# Patient Record
Sex: FEMALE | Race: WHITE | NOT HISPANIC OR LATINO | Employment: UNEMPLOYED | URBAN - METROPOLITAN AREA
[De-identification: names, ages, dates, MRNs, and addresses within clinical notes are randomized per-mention and may not be internally consistent; named-entity substitution may affect disease eponyms.]

---

## 2017-01-11 ENCOUNTER — OFFICE VISIT (OUTPATIENT)
Dept: ENDOCRINOLOGY | Facility: CLINIC | Age: 57
End: 2017-01-11

## 2017-01-11 VITALS
DIASTOLIC BLOOD PRESSURE: 74 MMHG | BODY MASS INDEX: 24.5 KG/M2 | WEIGHT: 175 LBS | HEIGHT: 71 IN | SYSTOLIC BLOOD PRESSURE: 120 MMHG | HEART RATE: 72 BPM | OXYGEN SATURATION: 96 %

## 2017-01-11 DIAGNOSIS — E03.9 ACQUIRED HYPOTHYROIDISM: Primary | ICD-10-CM

## 2017-01-11 DIAGNOSIS — E55.9 VITAMIN D DEFICIENCY: ICD-10-CM

## 2017-01-11 DIAGNOSIS — E03.9 HYPOTHYROIDISM (ACQUIRED): ICD-10-CM

## 2017-01-11 LAB
T4 FREE SERPL-MCNC: 1.16 NG/DL (ref 0.89–1.76)
TSH SERPL DL<=0.005 MIU/L-ACNC: 1.36 MIU/ML (ref 0.35–5.35)

## 2017-01-11 PROCEDURE — 99213 OFFICE O/P EST LOW 20 MIN: CPT | Performed by: INTERNAL MEDICINE

## 2017-01-11 RX ORDER — CHOLECALCIFEROL (VITAMIN D3) 50 MCG
2000 TABLET ORAL DAILY
Qty: 30 TABLET | Refills: 11 | Status: SHIPPED | OUTPATIENT
Start: 2017-01-11 | End: 2017-08-28

## 2017-01-11 RX ORDER — LEVOTHYROXINE SODIUM 0.05 MG/1
50 TABLET ORAL DAILY
Qty: 30 TABLET | Refills: 11 | Status: SHIPPED | OUTPATIENT
Start: 2017-01-11 | End: 2017-07-28 | Stop reason: SDUPTHER

## 2017-01-11 RX ORDER — OMEPRAZOLE 20 MG/1
20 CAPSULE, DELAYED RELEASE ORAL DAILY
COMMUNITY
End: 2020-09-01

## 2017-01-11 NOTE — MR AVS SNAPSHOT
China Gomes   1/11/2017 10:45 AM   Office Visit    Dept Phone:  [phone]   Encounter #:  [id number]    Provider:  Billie DARNELL MD   Department:  [facility] INTERNAL MEDICINE AND ENDOCRINOLOGY                Your Full Care Plan              Today's Medication Changes          These changes are accurate as of: 1/11/17 11:56 AM.  If you have any questions, ask your nurse or doctor.               New Medication(s)Ordered:     Vitamin D 2000 UNITS tablet   Take 2,000 Units by mouth Daily.   Started by:  Billie DARNELL MD            Where to Get Your Medications      You can get these medications from any pharmacy     Bring a paper prescription for each of these medications     levothyroxine 50 MCG tablet    Vitamin D 2000 UNITS tablet                  Your Updated Medication List          This list is accurate as of: 1/11/17 11:56 AM.  Always use your most recent med list.                estrogen (conjugated)-medroxyprogesterone 0.3-1.5 MG per tablet   Commonly known as:  PREMPRO       levothyroxine 50 MCG tablet   Commonly known as:  SYNTHROID, LEVOTHROID   Take 1 tablet by mouth Daily.       omeprazole 20 MG capsule   Commonly known as:  priLOSEC       Vitamin D 2000 UNITS tablet   Take 2,000 Units by mouth Daily.               We Performed the Following     T4, Free     TSH       You Were Diagnosed With        Codes Comments    Acquired hypothyroidism    -  Primary ICD-10-CM: E03.9  ICD-9-CM: 244.9     Hypothyroidism (acquired)     ICD-10-CM: E03.9  ICD-9-CM: 244.9     Vitamin D deficiency     ICD-10-CM: E55.9  ICD-9-CM: 268.9       Instructions     None    Patient Instructions History      Upcoming Appointments     Visit Type Date Time Department    FO[facility]11[facility]2017 10:[facility]    FOLLOW UP 7/19/2017  1:45 PM [facility] END [facility]      MyChart Signup     [facility] [employer] allows you to send messages to your doctor, view your test results, renew your  "prescriptions, schedule appointments, and more. To sign up, go to [email or web address] and click on the Sign Up Now link in the New User? box. Enter your [employer] Activation Code exactly as it appears below along with the last four digits of your Social Security Number and your Date of Birth () to complete the sign-up process. If you do not sign up before the expiration date, you must request a new code.    [employer] Activation Code: [email or web address]  Expires: 2017 11:56 AM    If you have questions, you can email [email or web address] or call [phone] to talk to our [employer] staff. Remember, [employer] is NOT to be used for urgent needs. For medical emergencies, dial 911.               Other Info from Your Visit           Your Appointments     2017  1:45 PM EDT   Follow Up with Billie DARNELL MD   [facility] INTERNAL MEDICINE AND ENDOCRINOLOGY (--)    [address] [place] [id number] [phone]           Arrive 15 minutes prior to appointment.              Allergies     Demerol [Meperidine]  Itching      Reason for Visit     Hypothyroidism F/u visit for hypothyroidism, no recent labs. Pt stated no new sx or concerns and feels great overall.       Vital Signs     Blood Pressure Pulse Height Weight Oxygen Saturation Body Mass Index    120/74 72 71\" (180.3 cm) 175 lb (79.4 kg) 96% 24.41 kg/m2    Smoking Status                   Never Smoker           Problems and Diagnoses Noted     Underactive thyroid    Vitamin D deficiency    Acquired underactive thyroid            "

## 2017-01-11 NOTE — PROGRESS NOTES
"Chief complaint  Hypothyroidism (F/u visit for hypothyroidism, no recent labs. Pt stated no new sx or concerns and feels great overall. )    Subjective   China Gomes is a 56 y.o. female is here today for follow-up.  Hypothyroidism, diagnosed in 2012.   Restarted Levothyroxine 25 mcg in March 2016 and increased to 50 mcg 6/2016. says feeling better overall     Vitamin D deficiency with level of 12.2 in June 2016. She completed 2 months of high dose Vitamin D       HRT is given by GYN. It controls the symptoms well.     She says that her energy level is well on current dose, no new complaints  Medications    Current Outpatient Prescriptions:   •  omeprazole (priLOSEC) 20 MG capsule, Take 20 mg by mouth Daily., Disp: , Rfl:   •  Cholecalciferol (VITAMIN D) 2000 UNITS tablet, Take 2,000 Units by mouth Daily., Disp: 30 tablet, Rfl: 11  •  estrogen, conjugated,-medroxyprogesterone (PREMPRO) 0.3-1.5 MG per tablet, Take 1 tablet by mouth daily., Disp: , Rfl:   •  levothyroxine (SYNTHROID, LEVOTHROID) 50 MCG tablet, Take 1 tablet by mouth Daily., Disp: 30 tablet, Rfl: 11    PMH  The following portions of the patient's history were reviewed and updated as appropriate: allergies, current medications, past family history, past medical history, past social history, past surgical history and problem list.    Review of systems  Review of Systems   All other systems reviewed and are negative.      Physical exam  Objective   Blood pressure 120/74, pulse 72, height 71\" (180.3 cm), weight 175 lb (79.4 kg), SpO2 96 %. Body mass index is 24.41 kg/(m^2).  Physical Exam   Constitutional: She is oriented to person, place, and time. She appears well-developed and well-nourished.   HENT:   Head: Normocephalic and atraumatic.   Mouth/Throat: Oropharynx is clear and moist.   Eyes: Conjunctivae are normal.   Neck: Normal range of motion. No muscular tenderness present. Carotid bruit is not present. No thyroid mass and no thyromegaly " present.   The neck is supple and no assimetry visualized   Cardiovascular: Normal rate, regular rhythm and normal heart sounds.    Pulmonary/Chest: Effort normal and breath sounds normal.   Musculoskeletal: She exhibits no edema.   Lymphadenopathy:     She has no cervical adenopathy.   Neurological: She is alert and oriented to person, place, and time.   Skin: Skin is warm. No rash noted.   Psychiatric: She has a normal mood and affect. Thought content normal.   Vitals reviewed.        LABS AND IMAGING  No visits with results within 1 Month(s) from this visit.  Latest known visit with results is:    Office Visit on 05/25/2016   Component Date Value Ref Range Status   • TSH 05/25/2016 2.960  0.450 - 4.500 uIU/mL Final           Assessment  Assessment/Plan   Problem List Items Addressed This Visit        Digestive    Vitamin D deficiency       Endocrine    Hypothyroidism - Primary    Relevant Medications    levothyroxine (SYNTHROID, LEVOTHROID) 50 MCG tablet    Other Relevant Orders    TSH    T4, Free      Other Visit Diagnoses     Hypothyroidism (acquired)        Relevant Medications    levothyroxine (SYNTHROID, LEVOTHROID) 50 MCG tablet          Plan  Continue same dose levothyroxine 50 µg. Repeat labs today    I have advised patient to start maintenance dose vitamin D 2000 units daily    Follow-up in 6 months

## 2017-01-13 ENCOUNTER — OFFICE VISIT (OUTPATIENT)
Dept: FAMILY MEDICINE CLINIC | Facility: CLINIC | Age: 57
End: 2017-01-13

## 2017-01-13 VITALS
BODY MASS INDEX: 24.64 KG/M2 | DIASTOLIC BLOOD PRESSURE: 72 MMHG | HEIGHT: 71 IN | TEMPERATURE: 97.3 F | HEART RATE: 77 BPM | SYSTOLIC BLOOD PRESSURE: 122 MMHG | OXYGEN SATURATION: 99 % | WEIGHT: 176 LBS

## 2017-01-13 DIAGNOSIS — H69.91 EUSTACHIAN TUBE DISORDER, RIGHT: ICD-10-CM

## 2017-01-13 DIAGNOSIS — H65.01 RIGHT ACUTE SEROUS OTITIS MEDIA, RECURRENCE NOT SPECIFIED: Primary | ICD-10-CM

## 2017-01-13 PROCEDURE — 99213 OFFICE O/P EST LOW 20 MIN: CPT | Performed by: NURSE PRACTITIONER

## 2017-01-13 RX ORDER — AMOXICILLIN 500 MG/1
500 TABLET, FILM COATED ORAL 3 TIMES DAILY
Qty: 30 TABLET | Refills: 0 | Status: SHIPPED | OUTPATIENT
Start: 2017-01-13 | End: 2017-01-23

## 2017-01-13 NOTE — MR AVS SNAPSHOT
China Gomes   2017 1:00 PM   Office Visit    Dept Phone:  [phone]   Encounter #:  [id number]    Provider:  JOSE LUIS Ordonez   Department:  [facility] FAMILY MEDICINE                Your Full Care Plan              Your Updated Medication List          This list is accurate as of: 17  1:45 PM.  Always use your most recent med list.                estrogen (conjugated)-medroxyprogesterone 0.3-1.5 MG per tablet   Commonly known as:  PREMPRO       levothyroxine 50 MCG tablet   Commonly known as:  SYNTHROID, LEVOTHROID   Take 1 tablet by mouth Daily.       omeprazole 20 MG capsule   Commonly known as:  priLOSEC       Vitamin D 2000 UNITS tablet   Take 2,000 Units by mouth Daily.               You Were Diagnosed With        Codes Comments    Right acute serous otitis media, recurrence not specified    -  Primary ICD-10-CM: H65.01  ICD-9-CM: 381.01       Instructions     None    Patient Instructions History      Upcoming Appointments     Visit Type Date Time Department    SAME DAY 2017  1:00 PM [facility]    FOLLOW UP 2017  1:45 PM [facility]      [facility] Sign[facility]     [facility] [employer] allows you to send messages to your doctor, view your test results, renew your prescriptions, schedule appointments, and more. To sign up, go to [address] and click on the Sign Up Now link in the New User? box. Enter your [employer] Activation Code exactly as it appears below along with the last four digits of your Social Security Number and your Date of Birth () to complete the sign-up process. If you do not sign up before the expiration date, you must request a new code.    [employer] Activation Code: [email or web address]  Expires: 2017 11:56 AM    If you have questions, you can email [email or web address] or call [phone] to talk to our [employer] staff. Remember, [employer] is NOT to be used for urgent needs. For medical  "emergencies, dial 911.               Other Info from Your Visit           Your Appointments     Jul 19, 2017  1:45 PM EDT   Follow Up with Billie DARNELL MD   [facility] INTERNAL MEDICINE AND ENDOCRINOLOGY (--)    [address] [place] [id number] [phone]           Arrive 15 minutes prior to appointment.              Allergies     Demerol [Meperidine]  Itching      Reason for Visit     Earache r ear      Vital Signs     Blood Pressure Pulse Temperature Height Weight Oxygen Saturation    122/72 77 97.3 °F (36.3 °C) 71\" (180.3 cm) 176 lb (79.8 kg) 99%    Body Mass Index Smoking Status                24.55 kg/m2 Never Smoker          Problems and Diagnoses Noted     Right middle ear infection    -  Primary        "

## 2017-01-13 NOTE — PROGRESS NOTES
Subjective   China Gomes is a 56 y.o. female.     History of Present Illness     Right ear pain  Ms Gomes comes in today with c/o right ear pain for 2 days.  She had a URI 2 weeks prior, that has resolved.  She denies fever, vertigo or headaches.      The following portions of the patient's history were reviewed and updated as appropriate: allergies, current medications, past family history, past medical history, past social history, past surgical history and problem list.    Review of Systems   Constitutional: Negative.    HENT: Positive for ear pain. Negative for congestion, ear discharge and postnasal drip.    Respiratory: Negative.    Cardiovascular: Negative.    Neurological: Negative.        Objective   Physical Exam   Constitutional: She is oriented to person, place, and time. She appears well-developed and well-nourished. No distress.   HENT:   Head: Normocephalic and atraumatic.   Left Ear: External ear normal.   Nose: Nose normal.   Mouth/Throat: Oropharynx is clear and moist.   Right TM is dull with slight erythema   Eyes: Conjunctivae are normal. Pupils are equal, round, and reactive to light.   Neck: Normal range of motion. Neck supple.   Cardiovascular: Normal rate, regular rhythm, normal heart sounds and intact distal pulses.  Exam reveals no gallop and no friction rub.    No murmur heard.  Pulmonary/Chest: Effort normal and breath sounds normal. No respiratory distress. She has no wheezes. She has no rales.   Lymphadenopathy:     She has no cervical adenopathy.   Neurological: She is alert and oriented to person, place, and time.       Assessment/Plan   China was seen today for earache.    Diagnoses and all orders for this visit:    Right acute serous otitis media, recurrence not specified  -     amoxicillin (AMOXIL) 500 MG tablet; Take 1 tablet by mouth 3 (Three) Times a Day for 10 days.    Eustachian tube disorder, right      She is flying Monday, I also hand wrote her one 4m dexathasone  tablet to take before flying for there ETD.

## 2017-01-23 DIAGNOSIS — E03.9 HYPOTHYROIDISM (ACQUIRED): ICD-10-CM

## 2017-01-23 RX ORDER — CHOLECALCIFEROL (VITAMIN D3) 50 MCG
2000 TABLET ORAL DAILY
Qty: 30 TABLET | Refills: 11 | OUTPATIENT
Start: 2017-01-23 | End: 2018-01-23

## 2017-01-23 RX ORDER — LEVOTHYROXINE SODIUM 0.05 MG/1
50 TABLET ORAL DAILY
Qty: 30 TABLET | Refills: 11 | OUTPATIENT
Start: 2017-01-23

## 2017-03-13 ENCOUNTER — OFFICE VISIT (OUTPATIENT)
Dept: FAMILY MEDICINE CLINIC | Facility: CLINIC | Age: 57
End: 2017-03-13

## 2017-03-13 VITALS
DIASTOLIC BLOOD PRESSURE: 80 MMHG | SYSTOLIC BLOOD PRESSURE: 118 MMHG | TEMPERATURE: 98.8 F | WEIGHT: 174 LBS | OXYGEN SATURATION: 98 % | BODY MASS INDEX: 24.36 KG/M2 | HEART RATE: 84 BPM | HEIGHT: 71 IN

## 2017-03-13 DIAGNOSIS — H65.05 RECURRENT ACUTE SEROUS OTITIS MEDIA OF LEFT EAR: Primary | ICD-10-CM

## 2017-03-13 PROCEDURE — 99213 OFFICE O/P EST LOW 20 MIN: CPT | Performed by: NURSE PRACTITIONER

## 2017-03-13 PROCEDURE — 96372 THER/PROPH/DIAG INJ SC/IM: CPT | Performed by: NURSE PRACTITIONER

## 2017-03-13 RX ORDER — AMOXICILLIN 500 MG/1
500 TABLET, FILM COATED ORAL 3 TIMES DAILY
Qty: 30 TABLET | Refills: 0 | Status: SHIPPED | OUTPATIENT
Start: 2017-03-13 | End: 2017-03-23

## 2017-03-13 RX ORDER — METHYLPREDNISOLONE ACETATE 80 MG/ML
80 INJECTION, SUSPENSION INTRA-ARTICULAR; INTRALESIONAL; INTRAMUSCULAR; SOFT TISSUE ONCE
Status: COMPLETED | OUTPATIENT
Start: 2017-03-13 | End: 2017-03-13

## 2017-03-13 RX ADMIN — METHYLPREDNISOLONE ACETATE 80 MG: 80 INJECTION, SUSPENSION INTRA-ARTICULAR; INTRALESIONAL; INTRAMUSCULAR; SOFT TISSUE at 09:50

## 2017-03-13 NOTE — PROGRESS NOTES
"Subjective   China Gomes is a 57 y.o. female.     History of Present Illness   China is here today with complaint of left ear pain and \"ringing\" after an airplane flight 2 weeks ago.States it feels like she is \"in a hole\" and has hearing loss. She was treated for right otitis media prior to the trip and has history of recurrent otitis.  The following portions of the patient's history were reviewed and updated as appropriate: allergies, current medications, past family history, past medical history, past social history, past surgical history and problem list.    Review of Systems   HENT: Positive for ear pain and hearing loss. Negative for congestion, drooling, ear discharge, facial swelling, postnasal drip, rhinorrhea and sinus pressure.    Eyes: Negative.    Respiratory: Negative.    Cardiovascular: Negative.    Skin: Negative.    Neurological: Positive for dizziness. Negative for seizures, facial asymmetry, light-headedness, numbness and headaches.   Psychiatric/Behavioral: Negative.        Objective   Physical Exam   Constitutional: She is oriented to person, place, and time. She appears well-developed and well-nourished.   HENT:   Head: Normocephalic and atraumatic.   Right Ear: Hearing, external ear and ear canal normal. No middle ear effusion.   Left Ear: External ear and ear canal normal. Tympanic membrane is retracted. A middle ear effusion is present. Decreased hearing is noted.   Mouth/Throat: Oropharynx is clear and moist.   Eyes: Conjunctivae and EOM are normal. No scleral icterus.   Neck: Normal range of motion. Neck supple.   Cardiovascular: Normal rate, regular rhythm and normal heart sounds.    No murmur heard.  No bruit   Pulmonary/Chest: Effort normal and breath sounds normal.   Neurological: She is alert and oriented to person, place, and time.   Skin: Skin is warm and dry.   Psychiatric: She has a normal mood and affect. Her behavior is normal. Judgment and thought content normal. "   Nursing note and vitals reviewed.      Assessment/Plan   China was seen today for earache.    Diagnoses and all orders for this visit:    Recurrent acute serous otitis media of left ear  -     methylPREDNISolone acetate (DEPO-medrol) injection 80 mg; Inject 1 mL into the shoulder, thigh, or buttocks 1 (One) Time.  -     amoxicillin (AMOXIL) 500 MG tablet; Take 1 tablet by mouth 3 (Three) Times a Day for 10 days.        I think she has some ETD which is contributing to her chronic fluid behind TM.  I am going to give her 40 mg Depo-medrol injection  to decrease inflammation in ear; antibiotic prescribed to treat infection.  Patient was encouraged to keep me informed of any acute changes, lack of improvement, or any new concerning symptoms.    Written by Belen ALDRIDGE, APRN student, acting as a scribe for JOSE LUIS Garcia  This note accurately reflects work and decisions made by myself, Mojgan ASCENCIO

## 2017-06-08 ENCOUNTER — TELEPHONE (OUTPATIENT)
Dept: INTERNAL MEDICINE | Facility: CLINIC | Age: 57
End: 2017-06-08

## 2017-06-08 NOTE — TELEPHONE ENCOUNTER
Since you will not be in that following week do you want to s/d her for next available or work in sometime in Aug?

## 2017-06-08 NOTE — TELEPHONE ENCOUNTER
PATIENT HAD TO CANCEL HER APPOINTMENT WITH DR. BUENO ON 07/19/2017. SHE NEEDS TO RESCHEDULE THIS APPOINTMENT. SHE WOULD LIKE TO GET AN APPOINTMENT CLOSE TO THIS APPOINTMENT. THE NEXT WEEK OR SO AFTER CANCELED APPOINTMENT. YOU CAN REACH HER BACK [phone]

## 2017-07-10 ENCOUNTER — RESULTS ENCOUNTER (OUTPATIENT)
Dept: ENDOCRINOLOGY | Facility: CLINIC | Age: 57
End: 2017-07-10

## 2017-07-10 DIAGNOSIS — E03.9 ACQUIRED HYPOTHYROIDISM: ICD-10-CM

## 2017-07-28 DIAGNOSIS — E03.9 HYPOTHYROIDISM (ACQUIRED): ICD-10-CM

## 2017-07-28 RX ORDER — LEVOTHYROXINE SODIUM 0.05 MG/1
TABLET ORAL
Qty: 30 TABLET | Refills: 0 | Status: SHIPPED | OUTPATIENT
Start: 2017-07-28 | End: 2017-08-28 | Stop reason: SDUPTHER

## 2017-08-28 ENCOUNTER — OFFICE VISIT (OUTPATIENT)
Dept: ENDOCRINOLOGY | Facility: CLINIC | Age: 57
End: 2017-08-28

## 2017-08-28 VITALS
HEIGHT: 71 IN | SYSTOLIC BLOOD PRESSURE: 134 MMHG | OXYGEN SATURATION: 98 % | DIASTOLIC BLOOD PRESSURE: 74 MMHG | WEIGHT: 183.2 LBS | HEART RATE: 74 BPM | BODY MASS INDEX: 25.65 KG/M2

## 2017-08-28 DIAGNOSIS — E03.9 HYPOTHYROIDISM (ACQUIRED): ICD-10-CM

## 2017-08-28 DIAGNOSIS — E03.9 ACQUIRED HYPOTHYROIDISM: Primary | ICD-10-CM

## 2017-08-28 LAB
ALBUMIN SERPL-MCNC: 4.1 G/DL (ref 3.2–4.8)
ALBUMIN/GLOB SERPL: 1.6 G/DL (ref 1.5–2.5)
ALP SERPL-CCNC: 106 U/L (ref 25–100)
ALT SERPL-CCNC: 17 U/L (ref 7–40)
AST SERPL-CCNC: 20 U/L (ref 0–33)
BILIRUB SERPL-MCNC: 0.4 MG/DL (ref 0.3–1.2)
BUN SERPL-MCNC: 10 MG/DL (ref 9–23)
BUN/CREAT SERPL: 16.7 (ref 7–25)
CALCIUM SERPL-MCNC: 8.8 MG/DL (ref 8.7–10.4)
CHLORIDE SERPL-SCNC: 101 MMOL/L (ref 99–109)
CO2 SERPL-SCNC: 26 MMOL/L (ref 20–31)
CREAT SERPL-MCNC: 0.6 MG/DL (ref 0.6–1.3)
GLOBULIN SER CALC-MCNC: 2.5 GM/DL
GLUCOSE SERPL-MCNC: 85 MG/DL (ref 70–100)
POTASSIUM SERPL-SCNC: 4 MMOL/L (ref 3.5–5.5)
PROT SERPL-MCNC: 6.6 G/DL (ref 5.7–8.2)
SODIUM SERPL-SCNC: 142 MMOL/L (ref 132–146)
T4 FREE SERPL-MCNC: 1.19 NG/DL (ref 0.89–1.76)
TSH SERPL DL<=0.005 MIU/L-ACNC: 1.63 MIU/ML (ref 0.35–5.35)

## 2017-08-28 PROCEDURE — 99213 OFFICE O/P EST LOW 20 MIN: CPT | Performed by: INTERNAL MEDICINE

## 2017-08-28 RX ORDER — LEVOTHYROXINE SODIUM 0.05 MG/1
50 TABLET ORAL DAILY
Qty: 30 TABLET | Refills: 11 | Status: SHIPPED | OUTPATIENT
Start: 2017-08-28 | End: 2018-02-28 | Stop reason: SDUPTHER

## 2017-08-28 NOTE — PROGRESS NOTES
"Chief complaint  Hypothyroidism (F/u visit for hypothyroidism, Pt stated that she has been feeling great on new levothyroxine dosage. Has been having edema in bilateral legs in the evenings. )    Subjective   China Gomes is a 57 y.o. female is here today for follow-up.  Hypothyroidism, diagnosed in 2012.   Restarted Levothyroxine 25 mcg in March 2016 and increased to 50 mcg 6/2016. says feeling better overall     Vitamin D deficiency with level of 12.2 in June 2016. She completed 2 months of high dose Vitamin D       HRT is given by GYN. It controls the symptoms well.     She says that her energy level is well on current dose. C/o leg swelling, worse in the evening. Eats out more often or after a trip.     Medications    Current Outpatient Prescriptions:   •  estrogen, conjugated,-medroxyprogesterone (PREMPRO) 0.3-1.5 MG per tablet, Take 1 tablet by mouth daily., Disp: , Rfl:   •  levothyroxine (SYNTHROID, LEVOTHROID) 50 MCG tablet, take 1 tablet by mouth once daily, Disp: 30 tablet, Rfl: 0  •  omeprazole (priLOSEC) 20 MG capsule, Take 20 mg by mouth Daily., Disp: , Rfl:     PMH  The following portions of the patient's history were reviewed and updated as appropriate: allergies, current medications, past family history, past medical history, past social history, past surgical history and problem list.    Review of systems  Review of Systems   Constitutional: Positive for unexpected weight change (weight gain ).   Cardiovascular: Positive for leg swelling.   All other systems reviewed and are negative.      Physical exam  Objective   Blood pressure 134/74, pulse 74, height 71\" (180.3 cm), weight 183 lb 3.2 oz (83.1 kg), SpO2 98 %. Body mass index is 25.55 kg/(m^2).  Physical Exam   Constitutional: She is oriented to person, place, and time. She appears well-developed and well-nourished.   HENT:   Head: Normocephalic and atraumatic.   Mouth/Throat: Oropharynx is clear and moist.   Eyes: Conjunctivae are normal. "   Neck: Normal range of motion. No muscular tenderness present. Carotid bruit is not present. No thyroid mass and no thyromegaly present.   The neck is supple and no assimetry visualized   Cardiovascular: Normal rate, regular rhythm and normal heart sounds.    Pulmonary/Chest: Effort normal and breath sounds normal.   Musculoskeletal: She exhibits no edema.   Lymphadenopathy:     She has no cervical adenopathy.   Neurological: She is alert and oriented to person, place, and time.   Skin: Skin is warm. No rash noted.   Psychiatric: She has a normal mood and affect. Thought content normal.   Vitals reviewed.        LABS AND IMAGING  No visits with results within 1 Month(s) from this visit.  Latest known visit with results is:    Office Visit on 01/11/2017   Component Date Value Ref Range Status   • TSH 01/11/2017 1.365  0.350 - 5.350 mIU/mL Final   • Free T4 01/11/2017 1.16  0.89 - 1.76 ng/dL Final           Assessment  Assessment/Plan   Problem List Items Addressed This Visit        Digestive    Vitamin D deficiency       Endocrine    Hypothyroidism - Primary          Plan  Continue same dose levothyroxine 50 µg. Repeat labs today  Monitor salt intake. Consider low dose diuretic if persists.     Cont maintenance dose vitamin D 2000 units daily.     Follow-up in 6 months

## 2018-02-28 ENCOUNTER — OFFICE VISIT (OUTPATIENT)
Dept: ENDOCRINOLOGY | Facility: CLINIC | Age: 58
End: 2018-02-28

## 2018-02-28 VITALS
BODY MASS INDEX: 25.9 KG/M2 | HEART RATE: 80 BPM | WEIGHT: 185 LBS | SYSTOLIC BLOOD PRESSURE: 122 MMHG | DIASTOLIC BLOOD PRESSURE: 76 MMHG | HEIGHT: 71 IN | OXYGEN SATURATION: 98 %

## 2018-02-28 DIAGNOSIS — R53.83 OTHER FATIGUE: ICD-10-CM

## 2018-02-28 DIAGNOSIS — E03.9 HYPOTHYROIDISM (ACQUIRED): ICD-10-CM

## 2018-02-28 DIAGNOSIS — E55.9 VITAMIN D DEFICIENCY: ICD-10-CM

## 2018-02-28 DIAGNOSIS — E03.9 ACQUIRED HYPOTHYROIDISM: Primary | ICD-10-CM

## 2018-02-28 LAB
25(OH)D3+25(OH)D2 SERPL-MCNC: 5.8 NG/ML
T4 FREE SERPL-MCNC: 1.19 NG/DL (ref 0.89–1.76)
TSH SERPL DL<=0.005 MIU/L-ACNC: 4.62 MIU/ML (ref 0.35–5.35)

## 2018-02-28 PROCEDURE — 99213 OFFICE O/P EST LOW 20 MIN: CPT | Performed by: INTERNAL MEDICINE

## 2018-02-28 RX ORDER — LEVOTHYROXINE SODIUM 0.05 MG/1
50 TABLET ORAL DAILY
Qty: 30 TABLET | Refills: 11 | Status: SHIPPED | OUTPATIENT
Start: 2018-02-28 | End: 2018-03-08 | Stop reason: ALTCHOICE

## 2018-02-28 NOTE — PROGRESS NOTES
"Chief complaint  Hypothyroidism (F/u for hypothyroidism, C/o weight gain. )    Subjective   China Gomes is a 58 y.o. female is here today for follow-up.  Hypothyroidism, diagnosed in 2012.   Restarted Levothyroxine 25 mcg in March 2016 and increased to 50 mcg 6/2016.     Vitamin D deficiency with level of 12.2 in June 2016. She completed 2 months of high dose Vitamin D. She I snot taking Vit D now.       HRT is given by GYN. It controls the symptoms well.     C/o weight gain and fatigue.      Medications    Current Outpatient Prescriptions:   •  estrogen, conjugated,-medroxyprogesterone (PREMPRO) 0.3-1.5 MG per tablet, Take 1 tablet by mouth daily., Disp: , Rfl:   •  levothyroxine (SYNTHROID, LEVOTHROID) 50 MCG tablet, Take 1 tablet by mouth Daily., Disp: 30 tablet, Rfl: 11  •  omeprazole (priLOSEC) 20 MG capsule, Take 20 mg by mouth Daily., Disp: , Rfl:     PMH  The following portions of the patient's history were reviewed and updated as appropriate: allergies, current medications, past family history, past medical history, past social history, past surgical history and problem list.    Review of systems  Review of Systems   Constitutional: Positive for fatigue and unexpected weight change (weight gain ).   Cardiovascular: Negative for leg swelling.   All other systems reviewed and are negative.      Physical exam  Objective   Blood pressure 122/76, pulse 80, height 180.3 cm (71\"), weight 83.9 kg (185 lb), SpO2 98 %. Body mass index is 25.8 kg/(m^2).  Physical Exam   Constitutional: She is oriented to person, place, and time. She appears well-developed and well-nourished.   HENT:   Head: Normocephalic and atraumatic.   Mouth/Throat: Oropharynx is clear and moist.   Eyes: Conjunctivae are normal.   Neck: Normal range of motion. No muscular tenderness present. Carotid bruit is not present. No thyroid mass and no thyromegaly present.   The neck is supple and no assimetry visualized   Cardiovascular: Normal rate, " regular rhythm and normal heart sounds.    Pulmonary/Chest: Effort normal and breath sounds normal.   Musculoskeletal: She exhibits no edema.   Lymphadenopathy:     She has no cervical adenopathy.   Neurological: She is alert and oriented to person, place, and time.   Skin: Skin is warm. No rash noted.   Psychiatric: She has a normal mood and affect. Thought content normal.   Vitals reviewed.        LABS AND IMAGING  Office Visit on 02/28/2018   Component Date Value Ref Range Status   • 25 Hydroxy, Vitamin D 02/28/2018 5.8  ng/ml Final    Comment: Reference Ranges for Total Vitamin D 25(OH)  Deficiency      <20.0 ng/ml  Insufficiency   20-30 ng/ml  Sufficiency     ng/ml  Toxicity         >100 ng/ml     • TSH 02/28/2018 4.621  0.350 - 5.350 mIU/mL Final   • Free T4 02/28/2018 1.19  0.89 - 1.76 ng/dL Final           Assessment  Assessment/Plan   Problem List Items Addressed This Visit        Digestive    Vitamin D deficiency    Relevant Orders    Vitamin D 25 Hydroxy (Completed)       Endocrine    Hypothyroidism - Primary    Relevant Medications    levothyroxine (SYNTHROID, LEVOTHROID) 50 MCG tablet    Other Relevant Orders    TSH (Completed)    T4, Free (Completed)       Other    Fatigue      Other Visit Diagnoses     Hypothyroidism (acquired)        Relevant Medications    levothyroxine (SYNTHROID, LEVOTHROID) 50 MCG tablet          Plan  Continue same dose levothyroxine 50 µg. Repeat labs today    Cont maintenance dose vitamin D 2000 units daily.   After lab review I will give further recommendations.   Follow-up in 6 months    Addendum: labs reviewed and thyroid function is low - increase levothyroxine to 75 mcg  Vitamin D is low and she needs another course of 3 months of high dose 50 000 IU weekly , followed by 50 000 every other week.

## 2018-03-08 ENCOUNTER — TELEPHONE (OUTPATIENT)
Dept: ENDOCRINOLOGY | Facility: CLINIC | Age: 58
End: 2018-03-08

## 2018-03-08 RX ORDER — LEVOTHYROXINE SODIUM 0.07 MG/1
75 TABLET ORAL DAILY
Qty: 30 TABLET | Refills: 5 | Status: SHIPPED | OUTPATIENT
Start: 2018-03-08 | End: 2018-08-21 | Stop reason: SDUPTHER

## 2018-03-08 RX ORDER — ERGOCALCIFEROL 1.25 MG/1
50000 CAPSULE ORAL WEEKLY
Qty: 4 CAPSULE | Refills: 3 | Status: SHIPPED | OUTPATIENT
Start: 2018-03-08 | End: 2018-12-04

## 2018-03-08 NOTE — TELEPHONE ENCOUNTER
----- Message from Billie DARNELL MD sent at 3/7/2018  4:24 PM EST -----  Please call the patient regarding her lab results.  thyroid function is low - increase levothyroxine to 75 mcg  Vitamin D is low and she needs another course of 3 months of high dose 50 000 IU weekly , followed by 50 000 every other week. SEnd rx fro both vit D and levothyroxine 75

## 2018-05-15 ENCOUNTER — TELEPHONE (OUTPATIENT)
Dept: ENDOCRINOLOGY | Facility: CLINIC | Age: 58
End: 2018-05-15

## 2018-05-15 NOTE — TELEPHONE ENCOUNTER
----- Message from Billie DARNELL MD sent at 5/15/2018 12:52 PM EDT -----  Could you please call her and let her know that Alejo Padgett is now part of [facility] Neurology department and not seeing primary care   ----- Message -----  From: Billie DARNELL MD  Sent: 2/28/2018  11:06 AM  To: Billie DARNELL MD    Find out where  PCP alejo padgett went. Info said [facility], but she is not showing there. Patient asked to try locating her

## 2018-08-21 RX ORDER — LEVOTHYROXINE SODIUM 0.07 MG/1
TABLET ORAL
Qty: 30 TABLET | Refills: 5 | Status: SHIPPED | OUTPATIENT
Start: 2018-08-21 | End: 2019-02-12 | Stop reason: SDUPTHER

## 2018-12-04 ENCOUNTER — OFFICE VISIT (OUTPATIENT)
Dept: ENDOCRINOLOGY | Facility: CLINIC | Age: 58
End: 2018-12-04

## 2018-12-04 VITALS
HEIGHT: 71 IN | DIASTOLIC BLOOD PRESSURE: 77 MMHG | WEIGHT: 186.6 LBS | HEART RATE: 82 BPM | BODY MASS INDEX: 26.12 KG/M2 | OXYGEN SATURATION: 98 % | SYSTOLIC BLOOD PRESSURE: 112 MMHG

## 2018-12-04 DIAGNOSIS — E55.9 VITAMIN D DEFICIENCY: ICD-10-CM

## 2018-12-04 DIAGNOSIS — E03.9 ACQUIRED HYPOTHYROIDISM: Primary | ICD-10-CM

## 2018-12-04 LAB
25(OH)D3+25(OH)D2 SERPL-MCNC: 10.3 NG/ML
ALBUMIN SERPL-MCNC: 4.07 G/DL (ref 3.2–4.8)
ALBUMIN/GLOB SERPL: 1.9 G/DL (ref 1.5–2.5)
ALP SERPL-CCNC: 90 U/L (ref 25–100)
ALT SERPL-CCNC: 16 U/L (ref 7–40)
AST SERPL-CCNC: 18 U/L (ref 0–33)
BILIRUB SERPL-MCNC: 0.8 MG/DL (ref 0.3–1.2)
BUN SERPL-MCNC: 13 MG/DL (ref 9–23)
BUN/CREAT SERPL: 19.4 (ref 7–25)
CALCIUM SERPL-MCNC: 8.7 MG/DL (ref 8.7–10.4)
CHLORIDE SERPL-SCNC: 110 MMOL/L (ref 99–109)
CO2 SERPL-SCNC: 25 MMOL/L (ref 20–31)
CREAT SERPL-MCNC: 0.67 MG/DL (ref 0.6–1.3)
GLOBULIN SER CALC-MCNC: 2.1 GM/DL
GLUCOSE SERPL-MCNC: 112 MG/DL (ref 70–100)
POTASSIUM SERPL-SCNC: 3.6 MMOL/L (ref 3.5–5.5)
PROT SERPL-MCNC: 6.2 G/DL (ref 5.7–8.2)
SODIUM SERPL-SCNC: 142 MMOL/L (ref 132–146)
T4 FREE SERPL-MCNC: 1.18 NG/DL (ref 0.89–1.76)
TSH SERPL DL<=0.005 MIU/L-ACNC: 1.83 MIU/ML (ref 0.35–5.35)

## 2018-12-04 PROCEDURE — 90471 IMMUNIZATION ADMIN: CPT | Performed by: INTERNAL MEDICINE

## 2018-12-04 PROCEDURE — 99213 OFFICE O/P EST LOW 20 MIN: CPT | Performed by: INTERNAL MEDICINE

## 2018-12-04 PROCEDURE — 90632 HEPA VACCINE ADULT IM: CPT | Performed by: INTERNAL MEDICINE

## 2018-12-04 NOTE — PROGRESS NOTES
"Chief complaint  Hypothyroidism (Follow Up)    Subjective   China Gomes is a 58 y.o. female is here today for follow-up.  Hypothyroidism, diagnosed in 2012.   On levothyroxine and the dose increased to 75 mcg daily last visit in 2/2018    Vit D deficiency - level was 5.8. In 2/2018 - she completed 3 months of high dose, then stopped.     C/o weight gain, fatigue improved.  She reported eating at the restaurant in [place] which was mentioned on the news as Hepatitis A risk. Recommended vaccine.     Medications    Current Outpatient Medications:   •  estrogen, conjugated,-medroxyprogesterone (PREMPRO) 0.3-1.5 MG per tablet, Take 1 tablet by mouth daily., Disp: , Rfl:   •  levothyroxine (SYNTHROID, LEVOTHROID) 75 MCG tablet, take 1 tablet by mouth once daily, Disp: 30 tablet, Rfl: 5  •  omeprazole (priLOSEC) 20 MG capsule, Take 20 mg by mouth Daily., Disp: , Rfl:     PMH  The following portions of the patient's history were reviewed and updated as appropriate: allergies, current medications, past family history, past medical history, past social history, past surgical history and problem list.    Review of systems  Review of Systems   Constitutional: Positive for unexpected weight change (weight gain ).   Cardiovascular: Negative for leg swelling.   All other systems reviewed and are negative.      Physical exam  Objective   Blood pressure 112/77, pulse 82, height 180.3 cm (71\"), weight 84.6 kg (186 lb 9.6 oz), SpO2 98 %. Body mass index is 26.03 kg/m².  Physical Exam   Constitutional: She is oriented to person, place, and time. She appears well-developed and well-nourished.   HENT:   Head: Normocephalic and atraumatic.   Mouth/Throat: Oropharynx is clear and moist.   Eyes: Conjunctivae are normal.   Neck: Normal range of motion. No muscular tenderness present. Carotid bruit is not present. No thyroid mass and no thyromegaly present.   The neck is supple and no assimetry visualized   Cardiovascular: Normal " rate, regular rhythm and normal heart sounds.   Pulmonary/Chest: Effort normal and breath sounds normal.   Musculoskeletal: She exhibits no edema.   Lymphadenopathy:     She has no cervical adenopathy.   Neurological: She is alert and oriented to person, place, and time.   Skin: Skin is warm. No rash noted.   Psychiatric: She has a normal mood and affect. Thought content normal.   Vitals reviewed.        LABS AND IMAGING  No visits with results within 1 Month(s) from this visit.   Latest known visit with results is:   Office Visit on 02/28/2018   Component Date Value Ref Range Status   • 25 Hydroxy, Vitamin D 02/28/2018 5.8  ng/ml Final    Comment: Reference Ranges for Total Vitamin D 25(OH)  Deficiency      <20.0 ng/ml  Insufficiency   20-30 ng/ml  Sufficiency     ng/ml  Toxicity         >100 ng/ml     • TSH 02/28/2018 4.621  0.350 - 5.350 mIU/mL Final   • Free T4 02/28/2018 1.19  0.89 - 1.76 ng/dL Final           Assessment  Assessment/Plan   Problem List Items Addressed This Visit        Digestive    Vitamin D deficiency       Endocrine    Hypothyroidism - Primary          Plan  Continue same dose levothyroxine 75 µg. Repeat labs today    Cont maintenance dose vitamin D 2000 units daily.     After lab review I will give further recommendations.     Hep A vaccine administered today.     Follow-up in 6 months

## 2018-12-12 ENCOUNTER — TELEPHONE (OUTPATIENT)
Dept: ENDOCRINOLOGY | Facility: CLINIC | Age: 58
End: 2018-12-12

## 2018-12-12 RX ORDER — CHOLECALCIFEROL (VITAMIN D3) 1250 MCG
50000 CAPSULE ORAL
Qty: 5 CAPSULE | Refills: 4 | Status: SHIPPED | OUTPATIENT
Start: 2018-12-12 | End: 2019-01-03

## 2018-12-12 NOTE — TELEPHONE ENCOUNTER
----- Message from Billie DARNELL MD sent at 12/11/2018 12:50 PM EST -----  Please call the patient regarding her lab results. Your labs showed normal thyroid function, electrolytes, renal function and normal liver enzymes.   Vit D is low, please take another 3 months of high dose vitamin D 50 000 IU weekly, followed by maintenance dose.  SEnd rx for 50 000 IU weekly 5 capsules with 4 refills.

## 2019-01-03 ENCOUNTER — OFFICE VISIT (OUTPATIENT)
Dept: INTERNAL MEDICINE | Facility: CLINIC | Age: 59
End: 2019-01-03

## 2019-01-03 VITALS
DIASTOLIC BLOOD PRESSURE: 70 MMHG | SYSTOLIC BLOOD PRESSURE: 118 MMHG | HEIGHT: 71 IN | TEMPERATURE: 97.7 F | OXYGEN SATURATION: 98 % | HEART RATE: 80 BPM | WEIGHT: 170 LBS | BODY MASS INDEX: 23.8 KG/M2

## 2019-01-03 DIAGNOSIS — E55.9 VITAMIN D DEFICIENCY: ICD-10-CM

## 2019-01-03 DIAGNOSIS — J01.00 ACUTE NON-RECURRENT MAXILLARY SINUSITIS: Primary | ICD-10-CM

## 2019-01-03 DIAGNOSIS — Z82.49 FAMILY HISTORY OF CORONARY ARTERY DISEASE: ICD-10-CM

## 2019-01-03 DIAGNOSIS — Z12.39 BREAST CANCER SCREENING: ICD-10-CM

## 2019-01-03 DIAGNOSIS — Z00.00 ROUTINE HEALTH MAINTENANCE: ICD-10-CM

## 2019-01-03 PROCEDURE — 99204 OFFICE O/P NEW MOD 45 MIN: CPT | Performed by: NURSE PRACTITIONER

## 2019-01-03 RX ORDER — AMOXICILLIN AND CLAVULANATE POTASSIUM 875; 125 MG/1; MG/1
1 TABLET, FILM COATED ORAL 2 TIMES DAILY
Qty: 20 TABLET | Refills: 0 | Status: SHIPPED | OUTPATIENT
Start: 2019-01-03 | End: 2019-01-13

## 2019-01-03 RX ORDER — ERGOCALCIFEROL 1.25 MG/1
50000 CAPSULE ORAL WEEKLY
Refills: 0 | COMMUNITY
Start: 2018-12-12 | End: 2019-12-13 | Stop reason: SDUPTHER

## 2019-01-03 NOTE — PROGRESS NOTES
Chief Complaint   Patient presents with   • Establish Care     NP   • Sinusitis     x2 days       History of Present Illness  58 y.o.female presents for establish care, sinusitis.    C/o Cough green secretions; nasal congestion sinusitis. Took advil cold sinus not improving. No fever.  Onset sx 2 days.    Hx gerd take prilosec. Hx of esoph stric; had baloon procedure.  No melena, hematochezia, or hematemesis. No abd pain.    See Dr. Meehan for hypothyroid.    Need updated mammogram has been 4-5 years.  Last colonoscopy 2 years ago per dr olena soriano  Review of Systems   Constitutional: Negative for chills and fever.   HENT: Positive for congestion, postnasal drip, rhinorrhea, sinus pressure and sore throat.    Respiratory: Positive for cough. Negative for shortness of breath.    Gastrointestinal: Positive for GERD. Negative for abdominal pain, blood in stool, constipation, nausea, vomiting and indigestion.   Genitourinary: Negative for difficulty urinating.   Musculoskeletal: Negative for arthralgias.   Skin: Negative for rash.         [facility]  The following portions of the patient's history were reviewed and updated as appropriate: allergies, current medications, past family history, past medical history, past social history, past surgical history and problem list.     Past Medical History:   Diagnosis Date   • GERD (gastroesophageal reflux disease)    • Hypothyroidism       Past Surgical History:   Procedure Laterality Date   • CHOLECYSTECTOMY     • HX OVARIAN CYSTECTOMY Bilateral    • OTHER SURGICAL HISTORY      ear pressure equalization tube, insertion, bilaterally      Allergies   Allergen Reactions   • Demerol [Meperidine] Itching      Family History   Problem Relation Age of Onset   • Arthritis Mother    • Hypertension Mother    • Thyroid disease Mother    • Thyroid disease Daughter       Social History     Socioeconomic History   • Marital status:    Tobacco Use   • Smoking status: Never Smoker   • Smokeless  "tobacco: Never Used   Substance and Sexual Activity   • Alcohol use: Yes     Comment: kimberly, 1-2 drinks per year   • Drug use: No   • Sexual activity: Yes     Partners: Male   Other Topics Concern   • Not on file   Social History Narrative   • Not on file         Current Outpatient Medications:   •  estrogen, conjugated,-medroxyprogesterone (PREMPRO) 0.3-1.5 MG per tablet, Take 1 tablet by mouth daily., Disp: , Rfl:   •  levothyroxine (SYNTHROID, LEVOTHROID) 75 MCG tablet, take 1 tablet by mouth once daily, Disp: 30 tablet, Rfl: 5  •  omeprazole (priLOSEC) 20 MG capsule, Take 20 mg by mouth Daily., Disp: , Rfl:   •  vitamin D (ERGOCALCIFEROL) 53316 units capsule capsule, Take 50,000 Units by mouth 1 (One) Time Per Week., Disp: , Rfl: 0    VITALS:  /70   Pulse 80   Temp 97.7 °F (36.5 °C)   Ht 180.3 cm (71\")   Wt 77.1 kg (170 lb)   SpO2 98%   BMI 23.71 kg/m²     Physical Exam   Constitutional: She is oriented to person, place, and time. She appears well-developed and well-nourished. No distress.   HENT:   Head: Normocephalic.   Right Ear: Tympanic membrane, external ear and ear canal normal.   Left Ear: Tympanic membrane, external ear and ear canal normal.   Nose: Mucosal edema, rhinorrhea and congestion present. Right sinus exhibits maxillary sinus tenderness. Left sinus exhibits maxillary sinus tenderness.   Mouth/Throat: Posterior oropharyngeal erythema present. No oropharyngeal exudate, posterior oropharyngeal edema or tonsillar abscesses.   Eyes: EOM are normal. Pupils are equal, round, and reactive to light.   Neck: Normal range of motion. Neck supple.   Cardiovascular: Normal rate, regular rhythm, normal heart sounds and intact distal pulses.   Pulmonary/Chest: Effort normal and breath sounds normal. No respiratory distress.   Abdominal: Soft. Bowel sounds are normal. There is no tenderness.   Musculoskeletal: Normal range of motion.   Normal ROM all major joints   Lymphadenopathy:        Head " (right side): No submental, no submandibular and no tonsillar adenopathy present.        Head (left side): No submental, no submandibular and no tonsillar adenopathy present.     She has no cervical adenopathy.   Neurological: She is alert and oriented to person, place, and time.   Skin: Skin is warm and dry. Capillary refill takes less than 2 seconds. No rash noted.   Psychiatric: She has a normal mood and affect. Her behavior is normal.       LABS  Results for orders placed or performed in visit on 12/04/18   Comprehensive Metabolic Panel   Result Value Ref Range    Glucose 112 (H) 70 - 100 mg/dL    BUN 13 9 - 23 mg/dL    Creatinine 0.67 0.60 - 1.30 mg/dL    eGFR Non African Am 91 >60 mL/min/1.73    eGFR African Am 110 >60 mL/min/1.73    BUN/Creatinine Ratio 19.4 7.0 - 25.0    Sodium 142 132 - 146 mmol/L    Potassium 3.6 3.5 - 5.5 mmol/L    Chloride 110 (H) 99 - 109 mmol/L    Total CO2 25.0 20.0 - 31.0 mmol/L    Calcium 8.7 8.7 - 10.4 mg/dL    Total Protein 6.2 5.7 - 8.2 g/dL    Albumin 4.07 3.20 - 4.80 g/dL    Globulin 2.1 gm/dL    A/G Ratio 1.9 1.5 - 2.5 g/dL    Total Bilirubin 0.8 0.3 - 1.2 mg/dL    Alkaline Phosphatase 90 25 - 100 U/L    AST (SGOT) 18 0 - 33 U/L    ALT (SGPT) 16 7 - 40 U/L   TSH   Result Value Ref Range    TSH 1.830 0.350 - 5.350 mIU/mL   T4, Free   Result Value Ref Range    Free T4 1.18 0.89 - 1.76 ng/dL   Vitamin D 25 Hydroxy   Result Value Ref Range    25 Hydroxy, Vitamin D 10.3 ng/ml       ASSESSMENT/PLAN  China was seen today for establish care and sinusitis.    Diagnoses and all orders for this visit:    Acute non-recurrent maxillary sinusitis  Comments:  Oral antihistamine such as allegra or claritn or zyrtec  Nasal steroid spray such as flonase or nasacort; 1 spray each nostril daily.    Orders:  -     amoxicillin-clavulanate (AUGMENTIN) 875-125 MG per tablet; Take 1 tablet by mouth 2 (Two) Times a Day for 10 days.    Breast cancer screening  -     Mammo screening digital  tomosynthesis bilateral w CAD; Future    Routine health maintenance  -     Lipid Panel; Future  -     CBC Auto Differential; Future    Family history of coronary artery disease  -     Lipid Panel; Future    If worsening of symptoms or no improvement in symptoms or fever > 101.5 patient should contact our office for further evaluation treatment or seek urgent care.    Need updated cbc and fasting lipids as part of routine health maint; can come in when fasting.    I discussed the patients findings and my recommendations with patient.     Patient was encouraged to keep me informed of any acute changes, lack of improvement, or any new concerning symptoms.    Patient voiced understanding of all instructions and denied further questions.      FOLLOW-UP  Return if symptoms worsen or fail to improve.    Electronically signed by:    JOSE LUIS Belcher  01/03/2019

## 2019-01-03 NOTE — PATIENT INSTRUCTIONS
Oral antihistamine such as allegra or claritn or zyrtec  Nasal steroid spray such as flonase or nasacort; 1 spray each nostril daily.

## 2019-02-12 ENCOUNTER — APPOINTMENT (OUTPATIENT)
Dept: OTHER | Facility: HOSPITAL | Age: 59
End: 2019-02-12

## 2019-02-12 ENCOUNTER — HOSPITAL ENCOUNTER (OUTPATIENT)
Dept: MAMMOGRAPHY | Facility: HOSPITAL | Age: 59
Discharge: HOME | End: 2019-02-12

## 2019-02-12 DIAGNOSIS — Z92.89 HX OF MAMMOGRAM: ICD-10-CM

## 2019-02-12 DIAGNOSIS — Z12.39 BREAST CANCER SCREENING: ICD-10-CM

## 2019-02-12 RX ORDER — LEVOTHYROXINE SODIUM 0.07 MG/1
TABLET ORAL
Qty: 30 TABLET | Refills: 5 | Status: SHIPPED | OUTPATIENT
Start: 2019-02-12 | End: 2019-06-06

## 2019-02-18 ENCOUNTER — APPOINTMENT (OUTPATIENT)
Dept: MAMMOGRAPHY | Facility: HOSPITAL | Age: 59
End: 2019-02-18

## 2019-02-23 ENCOUNTER — TELEPHONE (OUTPATIENT)
Dept: INTERNAL MEDICINE | Facility: CLINIC | Age: 59
End: 2019-02-23

## 2019-02-23 NOTE — TELEPHONE ENCOUNTER
----- Message from JOSE LUIS Peterson sent at 2/23/2019  1:31 PM EST -----  Please call pt.  I rcvd a copy of her mammogram and ultrasound.  It says they discussed with her but I want to make sure.  She has more complex lesions on left breast that need aspiration or biopsy. Did she already get this scheduled? When is this going to be done and where?  If can be of assistance with referrals please let me know.

## 2019-04-15 ENCOUNTER — OFFICE VISIT (OUTPATIENT)
Dept: INTERNAL MEDICINE | Facility: CLINIC | Age: 59
End: 2019-04-15

## 2019-04-15 VITALS
WEIGHT: 180 LBS | HEIGHT: 71 IN | HEART RATE: 74 BPM | OXYGEN SATURATION: 98 % | SYSTOLIC BLOOD PRESSURE: 118 MMHG | TEMPERATURE: 97.8 F | BODY MASS INDEX: 25.2 KG/M2 | DIASTOLIC BLOOD PRESSURE: 72 MMHG

## 2019-04-15 DIAGNOSIS — H00.011 HORDEOLUM EXTERNUM OF RIGHT UPPER EYELID: Primary | ICD-10-CM

## 2019-04-15 PROCEDURE — 99213 OFFICE O/P EST LOW 20 MIN: CPT | Performed by: NURSE PRACTITIONER

## 2019-04-15 NOTE — PROGRESS NOTES
Chief Complaint   Patient presents with   • Eye Problem     Right, swelling, redness. Pt states started off itching male swollen and hurts.       History of Present Illness  59 y.o.female presents for eye problem.  Onset of symptoms Saturday 2 days ago.  Initially thought an eyelash was in her eye. Eye was itching some but the next day was swollen, red and had some drainage with matted together.  She had some neomycin polymycin eyedrops at home she has been using but has not improved.  She does have some mild pain on the upper eyelid on the right side.  Denies any foreign objects or risky behavior for foreign object exposure.  No changes in vision.      Review of Systems   Constitutional: Negative for chills and fever.   HENT: Negative.    Eyes: Positive for discharge, redness and itching. Negative for blurred vision and visual disturbance.       [facility]  The following portions of the patient's history were reviewed and updated as appropriate: allergies, current medications, past family history, past medical history, past social history, past surgical history and problem list.       Past Medical History:   Diagnosis Date   • GERD (gastroesophageal reflux disease)    • Hypothyroidism       Past Surgical History:   Procedure Laterality Date   • CHOLECYSTECTOMY     • HX OVARIAN CYSTECTOMY Bilateral    • OTHER SURGICAL HISTORY      ear pressure equalization tube, insertion, bilaterally      Allergies   Allergen Reactions   • Demerol [Meperidine] Itching      Family History   Problem Relation Age of Onset   • Arthritis Mother    • Hypertension Mother    • Thyroid disease Mother    • Thyroid disease Daughter    • Breast cancer Neg Hx    • Ovarian cancer Neg Hx             Current Outpatient Medications:   •  estrogen, conjugated,-medroxyprogesterone (PREMPRO) 0.3-1.5 MG per tablet, Take 1 tablet by mouth daily., Disp: , Rfl:   •  levothyroxine (SYNTHROID, LEVOTHROID) 75 MCG tablet, take 1 tablet by mouth once daily, Disp:  "30 tablet, Rfl: 5  •  omeprazole (priLOSEC) 20 MG capsule, Take 20 mg by mouth Daily., Disp: , Rfl:   •  vitamin D (ERGOCALCIFEROL) 18627 units capsule capsule, Take 50,000 Units by mouth 1 (One) Time Per Week., Disp: , Rfl: 0    VITALS:  /72   Pulse 74   Temp 97.8 °F (36.6 °C)   Ht 180.3 cm (71\")   Wt 81.6 kg (180 lb)   SpO2 98%   Breastfeeding? No   BMI 25.10 kg/m²     Physical Exam   Constitutional: She is oriented to person, place, and time. She appears well-developed and well-nourished. No distress.   Eyes: EOM are normal. Pupils are equal, round, and reactive to light. Right eye exhibits hordeolum. Right eye exhibits no discharge. No foreign body present in the right eye. Right conjunctiva is injected.       Right upper eyelid hordeolum; mild surrounding erythema.  No drainage noted.  Small white head noted on the under side of the conjunctive in that area.   Pulmonary/Chest: Effort normal.   Neurological: She is alert and oriented to person, place, and time.   Skin: Skin is warm and dry.       LABS  No new labs    ASSESSMENT/PLAN  China was seen today for eye problem.    Diagnoses and all orders for this visit:    Hordeolum externum of right upper eyelid    Warm compresses to right eyelid 4 times a day for 10-15 minutes at a time.  Gently massage that area at night before bed with the warm compress.  ok to still use the Polysporin neomycin eyedrops that she has but not necessary.  Usman Lora may come to ahead and have some drainage which would be expected.  If no improvement or worsening over the next few days though she is to let me know.    I discussed the patients findings and my recommendations with patient.  Patient was encouraged to keep me informed of any acute changes, lack of improvement, or any new concerning symptoms.    Patient voiced understanding of all instructions and denied further questions.      FOLLOW-UP  Return if symptoms worsen or fail to improve.    Electronically " signed by:    China Wilder, JOSE LUIS  04/15/2019

## 2019-06-06 ENCOUNTER — OFFICE VISIT (OUTPATIENT)
Dept: ENDOCRINOLOGY | Facility: CLINIC | Age: 59
End: 2019-06-06

## 2019-06-06 VITALS
WEIGHT: 180.5 LBS | SYSTOLIC BLOOD PRESSURE: 110 MMHG | HEIGHT: 71 IN | HEART RATE: 62 BPM | OXYGEN SATURATION: 99 % | BODY MASS INDEX: 25.27 KG/M2 | DIASTOLIC BLOOD PRESSURE: 70 MMHG

## 2019-06-06 DIAGNOSIS — Z23 NEED FOR VACCINATION: Primary | ICD-10-CM

## 2019-06-06 DIAGNOSIS — E03.9 ACQUIRED HYPOTHYROIDISM: ICD-10-CM

## 2019-06-06 PROCEDURE — 90471 IMMUNIZATION ADMIN: CPT | Performed by: INTERNAL MEDICINE

## 2019-06-06 PROCEDURE — 90632 HEPA VACCINE ADULT IM: CPT | Performed by: INTERNAL MEDICINE

## 2019-06-06 PROCEDURE — 99213 OFFICE O/P EST LOW 20 MIN: CPT | Performed by: INTERNAL MEDICINE

## 2019-06-06 RX ORDER — LEVOTHYROXINE SODIUM 0.07 MG/1
75 TABLET ORAL DAILY
Qty: 30 TABLET | Refills: 6 | Status: SHIPPED | OUTPATIENT
Start: 2019-06-06 | End: 2019-12-13 | Stop reason: SDUPTHER

## 2019-06-06 NOTE — PROGRESS NOTES
Hypothyroidism (Follow UP:  feel like hair loss has slowed down )    Subjective    China Gomes is a 59 y.o. female. she is here today for follow-up for evaluation of   Hypothyroidism, diagnosed in 2012.   On levothyroxine and the dose increased to 75 mcg in 2/2018     Vit D deficiency - level was 5.8. In 2/2018 - she completed 3 months of high dose therapy, then discontinued.    Patient is a 59-yo female who presents to the office for follow-up for hypothyroidism. Patient states she feels healthy overall. Her hair is falling less, still notes dry skin but not worse than previously, denies heart palpitations, fatigue. She does not episodes of dizziness when she goes too long without eating. Of note, patient's last BG on CMP in December 2018 was 116.       Hypothyroidism   Pertinent negatives include no abdominal pain, chest pain, coughing, fatigue, numbness or rash.       Review of Systems  Review of Systems   Constitutional: Negative for appetite change, fatigue, unexpected weight gain and unexpected weight loss.   HENT: Negative.    Eyes: Negative.    Respiratory: Negative for cough and shortness of breath.    Cardiovascular: Negative for chest pain and palpitations.   Gastrointestinal: Negative for abdominal pain and constipation.   Endocrine: Positive for cold intolerance.   Genitourinary: Negative.    Musculoskeletal: Negative.    Skin: Positive for dry skin. Negative for rash.   Neurological: Positive for dizziness. Negative for numbness and headache.   Psychiatric/Behavioral: Negative for sleep disturbance and depressed mood. The patient is not nervous/anxious.      Current medications:  Current Outpatient Medications   Medication Sig Dispense Refill   • estrogen, conjugated,-medroxyprogesterone (PREMPRO) 0.3-1.5 MG per tablet Take 1 tablet by mouth daily.     • levothyroxine (SYNTHROID, LEVOTHROID) 75 MCG tablet Take 1 tablet by mouth Daily. 30 tablet 6   • omeprazole (priLOSEC) 20 MG capsule Take 20 mg  "by mouth Daily.     • vitamin D (ERGOCALCIFEROL) 37127 units capsule capsule Take 50,000 Units by mouth 1 (One) Time Per Week.  0     No current facility-administered medications for this visit.        The following portions of the patient's history were reviewed and updated as appropriate: She  has a past medical history of GERD (gastroesophageal reflux disease) and Hypothyroidism.  She  has a past surgical history that includes Other surgical history; Cholecystectomy; and hx ovarian cystectomy (Bilateral).  Her family history includes Arthritis in her mother; Hypertension in her mother; Thyroid disease in her daughter and mother.  She  reports that she has never smoked. She has never used smokeless tobacco. She reports that she drinks alcohol. She reports that she does not use drugs.  She is allergic to demerol [meperidine]..        Objective      Vitals:    06/06/19 0912   BP: 110/70   Pulse: 62   SpO2: 99%   Weight: 81.9 kg (180 lb 8 oz)   Height: 180.3 cm (71\")   Body mass index is 25.17 kg/m².  Physical Exam   Constitutional: She is oriented to person, place, and time. She appears well-developed and well-nourished.   HENT:   Head: Normocephalic and atraumatic.   Eyes: Pupils are equal, round, and reactive to light.   Neck: Neck supple. No JVD present. No thyromegaly present.   Cardiovascular: Normal rate, regular rhythm, normal heart sounds and intact distal pulses.   Pulmonary/Chest: Effort normal and breath sounds normal.   Musculoskeletal: She exhibits no edema.   Lymphadenopathy:     She has no cervical adenopathy.   Neurological: She is alert and oriented to person, place, and time.   Skin: Skin is warm and dry.   Psychiatric: She has a normal mood and affect. Her behavior is normal. Judgment and thought content normal.       LABS AND IMAGING  No visits with results within 1 Month(s) from this visit.   Latest known visit with results is:   Office Visit on 12/04/2018   Component Date Value Ref Range Status "   • Glucose 12/04/2018 112* 70 - 100 mg/dL Final   • BUN 12/04/2018 13  9 - 23 mg/dL Final   • Creatinine 12/04/2018 0.67  0.60 - 1.30 mg/dL Final   • eGFR Non  Am 12/04/2018 91  >60 mL/min/1.73 Final    Comment: [employer] Guidelines  Stage     Description        GFR  1         Normal or High     90+  2         Mild decrease      60-89  3         Moderate decrease  30-59  4         Severe decrease    15-29  5         Kidney failure     <15  The MDRD GFR formula is only valid for adults with stable  renal function between ages 18 and 70.     • eGFR  Am 12/04/2018 110  >60 mL/min/1.73 Final   • BUN/Creatinine Ratio 12/04/2018 19.4  7.0 - 25.0 Final   • Sodium 12/04/2018 142  132 - 146 mmol/L Final   • Potassium 12/04/2018 3.6  3.5 - 5.5 mmol/L Final   • Chloride 12/04/2018 110* 99 - 109 mmol/L Final   • Total CO2 12/04/2018 25.0  20.0 - 31.0 mmol/L Final   • Calcium 12/04/2018 8.7  8.7 - 10.4 mg/dL Final   • Total Protein 12/04/2018 6.2  5.7 - 8.2 g/dL Final   • Albumin 12/04/2018 4.07  3.20 - 4.80 g/dL Final   • Globulin 12/04/2018 2.1  gm/dL Final   • A/G Ratio 12/04/2018 1.9  1.5 - 2.5 g/dL Final   • Total Bilirubin 12/04/2018 0.8  0.3 - 1.2 mg/dL Final   • Alkaline Phosphatase 12/04/2018 90  25 - 100 U/L Final   • AST (SGOT) 12/04/2018 18  0 - 33 U/L Final   • ALT (SGPT) 12/04/2018 16  7 - 40 U/L Final   • TSH 12/04/2018 1.830  0.350 - 5.350 mIU/mL Final   • Free T4 12/04/2018 1.18  0.89 - 1.76 ng/dL Final   • 25 Hydroxy, Vitamin D 12/04/2018 10.3  ng/ml Final    Comment: Reference Ranges for Total Vitamin D 25(OH)  Deficiency      <20.0 ng/ml  Insufficiency   20-30 ng/ml  Sufficiency     ng/ml  Toxicity         >100 ng/ml         1. Need for vaccination    2. Acquired hypothyroidism        Assessment/Plan      Problem List Items Addressed This Visit        Endocrine    Hypothyroidism    Relevant Medications    levothyroxine (SYNTHROID, LEVOTHROID) 75 MCG tablet    Other  Relevant Orders    Lipid panel    TSH    T4, free    Hemoglobin A1c    Comprehensive metabolic panel      Other Visit Diagnoses     Need for vaccination    -  Primary    Relevant Medications    hepatitis A (HAVRIX) vaccine 1,440 Units (Completed)            PLAN  Continue same dose levothyroxine 75 µg. Repeat labs today     Cont maintenance dose vitamin D 2000 units daily.      Fasting labs today. I will check A1C since she has hx of gestational diabetes and reported feeling shaky after meals.      Follow-up in 6 months

## 2019-06-07 LAB
ALBUMIN SERPL-MCNC: 3.8 G/DL (ref 3.5–5.2)
ALBUMIN/GLOB SERPL: 1.4 G/DL
ALP SERPL-CCNC: 88 U/L (ref 39–117)
ALT SERPL-CCNC: 14 U/L (ref 1–33)
AST SERPL-CCNC: 15 U/L (ref 1–32)
BILIRUB SERPL-MCNC: 0.5 MG/DL (ref 0.2–1.2)
BUN SERPL-MCNC: 11 MG/DL (ref 6–20)
BUN/CREAT SERPL: 18 (ref 7–25)
CALCIUM SERPL-MCNC: 9.7 MG/DL (ref 8.6–10.5)
CHLORIDE SERPL-SCNC: 106 MMOL/L (ref 98–107)
CHOLEST SERPL-MCNC: 185 MG/DL (ref 0–200)
CO2 SERPL-SCNC: 24.5 MMOL/L (ref 22–29)
CREAT SERPL-MCNC: 0.61 MG/DL (ref 0.57–1)
GLOBULIN SER CALC-MCNC: 2.8 GM/DL
GLUCOSE SERPL-MCNC: 104 MG/DL (ref 65–99)
HBA1C MFR BLD: 5.4 % (ref 4.8–5.6)
HDLC SERPL-MCNC: 47 MG/DL (ref 40–60)
LDLC SERPL CALC-MCNC: 109 MG/DL (ref 0–100)
POTASSIUM SERPL-SCNC: 3.9 MMOL/L (ref 3.5–5.2)
PROT SERPL-MCNC: 6.6 G/DL (ref 6–8.5)
SODIUM SERPL-SCNC: 142 MMOL/L (ref 136–145)
T4 FREE SERPL-MCNC: 1.47 NG/DL (ref 0.93–1.7)
TRIGL SERPL-MCNC: 147 MG/DL (ref 0–150)
TSH SERPL DL<=0.005 MIU/L-ACNC: 1.48 UIU/ML (ref 0.45–4.5)
VLDLC SERPL CALC-MCNC: 29.4 MG/DL

## 2019-12-13 ENCOUNTER — OFFICE VISIT (OUTPATIENT)
Dept: ENDOCRINOLOGY | Facility: CLINIC | Age: 59
End: 2019-12-13

## 2019-12-13 VITALS
BODY MASS INDEX: 25.51 KG/M2 | HEIGHT: 71 IN | DIASTOLIC BLOOD PRESSURE: 70 MMHG | HEART RATE: 76 BPM | OXYGEN SATURATION: 97 % | SYSTOLIC BLOOD PRESSURE: 115 MMHG | WEIGHT: 182.2 LBS

## 2019-12-13 DIAGNOSIS — E03.9 ACQUIRED HYPOTHYROIDISM: Primary | ICD-10-CM

## 2019-12-13 DIAGNOSIS — E55.9 VITAMIN D DEFICIENCY: ICD-10-CM

## 2019-12-13 PROCEDURE — 99213 OFFICE O/P EST LOW 20 MIN: CPT | Performed by: INTERNAL MEDICINE

## 2019-12-13 RX ORDER — LEVOTHYROXINE SODIUM 0.07 MG/1
75 TABLET ORAL DAILY
Qty: 30 TABLET | Refills: 11 | Status: SHIPPED | OUTPATIENT
Start: 2019-12-13 | End: 2020-09-01

## 2019-12-13 RX ORDER — ERGOCALCIFEROL 1.25 MG/1
50000 CAPSULE ORAL WEEKLY
Qty: 5 CAPSULE | Refills: 11 | Status: SHIPPED | OUTPATIENT
Start: 2019-12-13 | End: 2020-09-01

## 2019-12-13 RX ORDER — NAPROXEN 500 MG/1
TABLET ORAL
Refills: 0 | COMMUNITY
Start: 2019-11-05 | End: 2020-09-01

## 2019-12-13 NOTE — PROGRESS NOTES
Hypothyroidism (Follow UP)    Subjective    China Gomes is a 59 y.o. female. she is here today for follow-up for evaluation of   Hypothyroidism, diagnosed in 2012.   On levothyroxine and the dose increased to 75 mcg in 2/2018     Vit D deficiency - level was 5.8. In 2/2018 - she completed 3 months of high dose therapy, then discontinued. Then she ran out     Patient is a 59-yo female who presents to the office for follow-up for hypothyroidism. Patient states she feels healthy overall. Her hair is falling less, still notes dry skin but not worse than previously. She also feels fatigued, but has busy work schedule.  Vit D was low each time we test after 3 months of replacement. She hasnt taken any Vit D since summer.     Hypothyroidism   Associated symptoms include fatigue. Pertinent negatives include no abdominal pain, chest pain, coughing, numbness or rash.       Review of Systems  Review of Systems   Constitutional: Positive for fatigue. Negative for appetite change, unexpected weight gain and unexpected weight loss.   HENT: Negative.    Eyes: Negative.    Respiratory: Negative for cough and shortness of breath.    Cardiovascular: Negative for chest pain and palpitations.   Gastrointestinal: Negative for abdominal pain and constipation.   Endocrine: Positive for cold intolerance.   Genitourinary: Negative.    Musculoskeletal: Negative.    Skin: Positive for dry skin. Negative for rash.   Neurological: Negative for numbness and headache.   Psychiatric/Behavioral: Negative for sleep disturbance and depressed mood. The patient is not nervous/anxious.      Current medications:  Current Outpatient Medications   Medication Sig Dispense Refill   • estrogen, conjugated,-medroxyprogesterone (PREMPRO) 0.3-1.5 MG per tablet Take 1 tablet by mouth daily.     • levothyroxine (SYNTHROID, LEVOTHROID) 75 MCG tablet Take 1 tablet by mouth Daily. 30 tablet 11   • naproxen (NAPROSYN) 500 MG tablet take 1 tablet by mouth every  "12 hours if needed for pain  0   • omeprazole (priLOSEC) 20 MG capsule Take 20 mg by mouth Daily.     • vitamin D (ERGOCALCIFEROL) 1.25 MG (74124 UT) capsule capsule Take 1 capsule by mouth 1 (One) Time Per Week. 5 capsule 11     No current facility-administered medications for this visit.        The following portions of the patient's history were reviewed and updated as appropriate: She  has a past medical history of GERD (gastroesophageal reflux disease) and Hypothyroidism.  She  has a past surgical history that includes Other surgical history; Cholecystectomy; and hx ovarian cystectomy (Bilateral).  Her family history includes Arthritis in her mother; Hypertension in her mother; Thyroid disease in her daughter and mother.  She  reports that she has never smoked. She has never used smokeless tobacco. She reports that she drinks alcohol. She reports that she does not use drugs.  She is allergic to demerol [meperidine]..        Objective      Vitals:    12/13/19 1034   BP: 115/70   Pulse: 76   SpO2: 97%   Weight: 82.6 kg (182 lb 3.2 oz)   Height: 180.3 cm (71\")   Body mass index is 25.41 kg/m².  Physical Exam   Constitutional: She is oriented to person, place, and time. She appears well-developed and well-nourished.   HENT:   Head: Normocephalic and atraumatic.   Cardiovascular: Normal rate, regular rhythm, normal heart sounds and intact distal pulses.   Pulmonary/Chest: Effort normal and breath sounds normal.   Musculoskeletal: She exhibits no edema.   Neurological: She is alert and oriented to person, place, and time.   Skin: Skin is warm and dry.   Psychiatric: She has a normal mood and affect. Her behavior is normal. Judgment and thought content normal.       LABS AND IMAGING  No visits with results within 1 Month(s) from this visit.   Latest known visit with results is:   Office Visit on 06/06/2019   Component Date Value Ref Range Status   • Glucose 06/06/2019 104* 65 - 99 mg/dL Final   • BUN 06/06/2019 11  6 " - 20 mg/dL Final   • Creatinine 06/06/2019 0.61  0.57 - 1.00 mg/dL Final   • eGFR Non African Am 06/06/2019 101  >60 mL/min/1.73 Final   • eGFR African Am 06/06/2019 122  >60 mL/min/1.73 Final   • BUN/Creatinine Ratio 06/06/2019 18.0  7.0 - 25.0 Final   • Sodium 06/06/2019 142  136 - 145 mmol/L Final   • Potassium 06/06/2019 3.9  3.5 - 5.2 mmol/L Final   • Chloride 06/06/2019 106  98 - 107 mmol/L Final   • Total CO2 06/06/2019 24.5  22.0 - 29.0 mmol/L Final   • Calcium 06/06/2019 9.7  8.6 - 10.5 mg/dL Final   • Total Protein 06/06/2019 6.6  6.0 - 8.5 g/dL Final   • Albumin 06/06/2019 3.80  3.50 - 5.20 g/dL Final   • Globulin 06/06/2019 2.8  gm/dL Final   • A/G Ratio 06/06/2019 1.4  g/dL Final   • Total Bilirubin 06/06/2019 0.5  0.2 - 1.2 mg/dL Final   • Alkaline Phosphatase 06/06/2019 88  39 - 117 U/L Final   • AST (SGOT) 06/06/2019 15  1 - 32 U/L Final   • ALT (SGPT) 06/06/2019 14  1 - 33 U/L Final   • Hemoglobin A1C 06/06/2019 5.40  4.80 - 5.60 % Final    Comment: Hemoglobin A1C Ranges:  Increased Risk for Diabetes  5.7% to 6.4%  Diabetes                     >= 6.5%  Diabetic Goal                < 7.0%     • Free T4 06/06/2019 1.47  0.93 - 1.70 ng/dL Final   • TSH 06/06/2019 1.480  0.450 - 4.500 uIU/mL Final   • Total Cholesterol 06/06/2019 185  0 - 200 mg/dL Final   • Triglycerides 06/06/2019 147  0 - 150 mg/dL Final   • HDL Cholesterol 06/06/2019 47  40 - 60 mg/dL Final   • VLDL Cholesterol 06/06/2019 29.4  mg/dL Final   • LDL Cholesterol  06/06/2019 109* 0 - 100 mg/dL Final       1. Acquired hypothyroidism    2. Vitamin D deficiency        Assessment/Plan      Problem List Items Addressed This Visit        Digestive    Vitamin D deficiency       Endocrine    Hypothyroidism - Primary    Relevant Medications    levothyroxine (SYNTHROID, LEVOTHROID) 75 MCG tablet    Other Relevant Orders    Basic Metabolic Panel    TSH    T4, Free            PLAN  Continue same dose levothyroxine 75 µg. Repeat labs today     Start   vitamin D 50 000 IU weekly, continue high dose for a year     Follow-up in 6 months

## 2019-12-14 LAB
BUN SERPL-MCNC: 6 MG/DL (ref 6–20)
BUN/CREAT SERPL: 8.2 (ref 7–25)
CALCIUM SERPL-MCNC: 9.6 MG/DL (ref 8.6–10.5)
CHLORIDE SERPL-SCNC: 103 MMOL/L (ref 98–107)
CO2 SERPL-SCNC: 26.2 MMOL/L (ref 22–29)
CREAT SERPL-MCNC: 0.73 MG/DL (ref 0.57–1)
GLUCOSE SERPL-MCNC: 92 MG/DL (ref 65–99)
POTASSIUM SERPL-SCNC: 4.3 MMOL/L (ref 3.5–5.2)
SODIUM SERPL-SCNC: 143 MMOL/L (ref 136–145)
T4 FREE SERPL-MCNC: 1.31 NG/DL (ref 0.93–1.7)
TSH SERPL DL<=0.005 MIU/L-ACNC: 2.19 UIU/ML (ref 0.27–4.2)

## 2020-09-01 ENCOUNTER — OFFICE VISIT (OUTPATIENT)
Dept: ENDOCRINOLOGY | Facility: CLINIC | Age: 60
End: 2020-09-01

## 2020-09-01 ENCOUNTER — LAB REQUISITION (OUTPATIENT)
Dept: LAB | Facility: HOSPITAL | Age: 60
End: 2020-09-01

## 2020-09-01 VITALS
WEIGHT: 182.9 LBS | DIASTOLIC BLOOD PRESSURE: 80 MMHG | HEIGHT: 71 IN | SYSTOLIC BLOOD PRESSURE: 117 MMHG | HEART RATE: 75 BPM | BODY MASS INDEX: 25.6 KG/M2 | OXYGEN SATURATION: 98 %

## 2020-09-01 DIAGNOSIS — E55.9 VITAMIN D DEFICIENCY, UNSPECIFIED: ICD-10-CM

## 2020-09-01 DIAGNOSIS — E55.9 VITAMIN D DEFICIENCY: ICD-10-CM

## 2020-09-01 DIAGNOSIS — E03.9 ACQUIRED HYPOTHYROIDISM: Primary | ICD-10-CM

## 2020-09-01 DIAGNOSIS — E03.9 HYPOTHYROIDISM, UNSPECIFIED: ICD-10-CM

## 2020-09-01 PROCEDURE — 36415 COLL VENOUS BLD VENIPUNCTURE: CPT | Performed by: INTERNAL MEDICINE

## 2020-09-01 PROCEDURE — 99213 OFFICE O/P EST LOW 20 MIN: CPT | Performed by: INTERNAL MEDICINE

## 2020-09-01 RX ORDER — LEVOTHYROXINE SODIUM 0.07 MG/1
75 TABLET ORAL DAILY
COMMUNITY
End: 2021-02-12 | Stop reason: SDUPTHER

## 2020-09-01 RX ORDER — ESTRADIOL 1 MG/1
1 TABLET ORAL DAILY
COMMUNITY

## 2020-09-01 RX ORDER — ERGOCALCIFEROL 1.25 MG/1
50000 CAPSULE ORAL WEEKLY
COMMUNITY
End: 2021-08-13 | Stop reason: SDUPTHER

## 2020-09-01 NOTE — PROGRESS NOTES
Hypothyroidism (Follow Up)    Subjective    China Gomes is a 60 y.o. female. she is here today for follow-up for evaluation of   Hypothyroidism, diagnosed in 2012.   On levothyroxine and the dose increased to 75 mcg in 2/2018     Vit D deficiency - level was 5.8. In 2/2018 - she completed 3 months of high dose therapy, then discontinued. Then she ran out     Patient is a 59-yo female who presents to the office for follow-up for hypothyroidism. Patient states she feels healthy overall. Her hair is falling less, still notes dry skin but not worse than previously. She also feels mildly fatigued.     Vit D was low and she is taking weekly medications, forget sometimes.     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 Leg swelling is improved.       Hypothyroidism   Associated symptoms include fatigue. Pertinent negatives include no abdominal pain, chest pain, coughing, numbness or rash.       Review of Systems  Review of Systems   Constitutional: Positive for fatigue. Negative for appetite change, unexpected weight gain and unexpected weight loss.   HENT: Negative.    Eyes: Negative.    Respiratory: Negative for cough and shortness of breath.    Cardiovascular: Negative for chest pain and palpitations.   Gastrointestinal: Negative for abdominal pain and constipation.   Endocrine: Positive for cold intolerance.   Genitourinary: Negative.    Musculoskeletal: Negative.    Skin: Positive for dry skin. Negative for rash.   Neurological: Negative for numbness and headache.   Psychiatric/Behavioral: Negative for sleep disturbance and depressed mood. The patient is not nervous/anxious.      Current medications:  Current Outpatient Medications   Medication Sig Dispense Refill   • estradiol (ESTRACE) 1 MG tablet Take 1 mg by mouth Daily.     • levothyroxine (SYNTHROID, LEVOTHROID) 75 MCG tablet Take 75 mcg by mouth Daily.     • progesterone (PROMETRIUM) 100 MG capsule  "Take 100 mg by mouth Daily.     • vitamin D (ERGOCALCIFEROL) 1.25 MG (74681 UT) capsule capsule Take 50,000 Units by mouth 1 (One) Time Per Week.       No current facility-administered medications for this visit.        The following portions of the patient's history were reviewed and updated as appropriate: She  has a past medical history of GERD (gastroesophageal reflux disease) and Hypothyroidism.  She  has a past surgical history that includes Other surgical history; Cholecystectomy; and hx ovarian cystectomy (Bilateral).  Her family history includes Arthritis in her mother; Hypertension in her mother; Thyroid disease in her daughter and mother.  She  reports that she has never smoked. She has never used smokeless tobacco. She reports that she drinks alcohol. She reports that she does not use drugs.  She is allergic to demerol [meperidine]..        Objective      Vitals:    09/01/20 1402   BP: 117/80   Pulse: 75   SpO2: 98%   Weight: 83 kg (182 lb 14.4 oz)   Height: 180.3 cm (71\")   Body mass index is 25.51 kg/m².  Physical Exam   Constitutional: She is oriented to person, place, and time. She appears well-developed and well-nourished.   HENT:   Head: Normocephalic and atraumatic.   Cardiovascular: Normal rate, regular rhythm, normal heart sounds and intact distal pulses.   Pulmonary/Chest: Effort normal and breath sounds normal.   Musculoskeletal: She exhibits no edema.   Neurological: She is alert and oriented to person, place, and time.   Skin: Skin is warm and dry.   Psychiatric: She has a normal mood and affect. Her behavior is normal. Judgment and thought content normal.       LABS AND IMAGING  No visits with results within 1 Month(s) from this visit.   Latest known visit with results is:   Office Visit on 12/13/2019   Component Date Value Ref Range Status   • Glucose 12/13/2019 92  65 - 99 mg/dL Final   • BUN 12/13/2019 6  6 - 20 mg/dL Final   • Creatinine 12/13/2019 0.73  0.57 - 1.00 mg/dL Final   • eGFR " Non  Am 12/13/2019 82  >60 mL/min/1.73 Final   • eGFR African Am 12/13/2019 99  >60 mL/min/1.73 Final   • BUN/Creatinine Ratio 12/13/2019 8.2  7.0 - 25.0 Final   • Sodium 12/13/2019 143  136 - 145 mmol/L Final   • Potassium 12/13/2019 4.3  3.5 - 5.2 mmol/L Final   • Chloride 12/13/2019 103  98 - 107 mmol/L Final   • Total CO2 12/13/2019 26.2  22.0 - 29.0 mmol/L Final   • Calcium 12/13/2019 9.6  8.6 - 10.5 mg/dL Final   • TSH 12/13/2019 2.190  0.270 - 4.200 uIU/mL Final   • Free T4 12/13/2019 1.31  0.93 - 1.70 ng/dL Final       1. Acquired hypothyroidism    2. Vitamin D deficiency        Assessment/Plan      Problem List Items Addressed This Visit        Digestive    Vitamin D deficiency    Relevant Orders    Vitamin D 25 Hydroxy       Endocrine    Hypothyroidism - Primary    Relevant Medications    levothyroxine (SYNTHROID, LEVOTHROID) 75 MCG tablet    Other Relevant Orders    TSH    T4, Free    Comprehensive Metabolic Panel            PLAN  Continue same dose levothyroxine 75 µg. Repeat labs today     Start  vitamin D 50 000 IU weekly, repeat levels.      Follow-up in 6 months.

## 2020-09-02 LAB
25(OH)D3+25(OH)D2 SERPL-MCNC: 35.3 NG/ML (ref 30–100)
ALBUMIN SERPL-MCNC: 4 G/DL (ref 3.8–4.9)
ALBUMIN/GLOB SERPL: 1.6 {RATIO} (ref 1.2–2.2)
ALP SERPL-CCNC: 88 IU/L (ref 39–117)
ALT SERPL-CCNC: 13 IU/L (ref 0–32)
AST SERPL-CCNC: 19 IU/L (ref 0–40)
BILIRUB SERPL-MCNC: 0.6 MG/DL (ref 0–1.2)
BUN SERPL-MCNC: 10 MG/DL (ref 6–24)
BUN/CREAT SERPL: 15 (ref 9–23)
CALCIUM SERPL-MCNC: 9.2 MG/DL (ref 8.7–10.2)
CHLORIDE SERPL-SCNC: 103 MMOL/L (ref 96–106)
CO2 SERPL-SCNC: 24 MMOL/L (ref 20–29)
CREAT SERPL-MCNC: 0.68 MG/DL (ref 0.57–1)
GLOBULIN SER CALC-MCNC: 2.5 G/DL (ref 1.5–4.5)
GLUCOSE SERPL-MCNC: 85 MG/DL (ref 65–99)
POTASSIUM SERPL-SCNC: 3.8 MMOL/L (ref 3.5–5.2)
PROT SERPL-MCNC: 6.5 G/DL (ref 6–8.5)
SODIUM SERPL-SCNC: 142 MMOL/L (ref 134–144)
T4 FREE SERPL-MCNC: 1.49 NG/DL (ref 0.82–1.77)
TSH SERPL DL<=0.005 MIU/L-ACNC: 1.71 UIU/ML (ref 0.45–4.5)

## 2021-02-14 RX ORDER — LEVOTHYROXINE SODIUM 0.07 MG/1
75 TABLET ORAL DAILY
Qty: 30 TABLET | Refills: 3 | Status: SHIPPED | OUTPATIENT
Start: 2021-02-14 | End: 2021-02-18 | Stop reason: SDUPTHER

## 2021-02-18 NOTE — TELEPHONE ENCOUNTER
Patient called and needs a refill on her Levthyroxine at [facility] in [place], please advise.

## 2021-02-19 RX ORDER — LEVOTHYROXINE SODIUM 0.07 MG/1
75 TABLET ORAL DAILY
Qty: 90 TABLET | Refills: 0 | Status: SHIPPED | OUTPATIENT
Start: 2021-02-19 | End: 2021-03-03 | Stop reason: SDUPTHER

## 2021-03-03 ENCOUNTER — OFFICE VISIT (OUTPATIENT)
Dept: ENDOCRINOLOGY | Facility: CLINIC | Age: 61
End: 2021-03-03

## 2021-03-03 VITALS
SYSTOLIC BLOOD PRESSURE: 120 MMHG | HEART RATE: 78 BPM | WEIGHT: 183.2 LBS | OXYGEN SATURATION: 98 % | TEMPERATURE: 97.5 F | HEIGHT: 71 IN | BODY MASS INDEX: 25.65 KG/M2 | DIASTOLIC BLOOD PRESSURE: 78 MMHG

## 2021-03-03 DIAGNOSIS — E03.9 ACQUIRED HYPOTHYROIDISM: ICD-10-CM

## 2021-03-03 DIAGNOSIS — E55.9 VITAMIN D DEFICIENCY: Primary | ICD-10-CM

## 2021-03-03 DIAGNOSIS — M67.432: ICD-10-CM

## 2021-03-03 PROCEDURE — 99214 OFFICE O/P EST MOD 30 MIN: CPT | Performed by: INTERNAL MEDICINE

## 2021-03-03 RX ORDER — LEVOTHYROXINE SODIUM 0.07 MG/1
75 TABLET ORAL DAILY
Qty: 90 TABLET | Refills: 3 | Status: SHIPPED | OUTPATIENT
Start: 2021-03-03 | End: 2021-07-12

## 2021-03-03 NOTE — PROGRESS NOTES
Hypothyroidism (Follow UP)    Subjective    China Gomes is a 61 y.o. female. she is here today for follow-up for evaluation of   Hypothyroidism, diagnosed in 2012.   Follow-up for hypothyroidism. Patient states she feels healthy overall. She also feels mildly fatigued.     On levothyroxine and the dose increased to 75 mcg in 2/2018     Vit D deficiency - level was 5.8. In 2/2018 - she completed 3 months of high dose therapy, then discontinued. Then she ran out     Patient had noticed left forearm pain and lump. She has ganglion cyst and feels that it has grown.     Review of Systems  Review of Systems   Constitutional: Positive for fatigue. Negative for appetite change, unexpected weight gain and unexpected weight loss.   HENT: Negative.    Eyes: Negative.    Respiratory: Negative for cough and shortness of breath.    Cardiovascular: Negative for chest pain and palpitations.   Gastrointestinal: Negative for abdominal pain and constipation.   Endocrine: Positive for cold intolerance.   Genitourinary: Negative.    Musculoskeletal: Negative.    Skin: Positive for dry skin. Negative for rash.   Neurological: Negative for numbness and headache.   Psychiatric/Behavioral: Negative for sleep disturbance and depressed mood. The patient is not nervous/anxious.      Current medications:  Current Outpatient Medications   Medication Sig Dispense Refill   • estradiol (ESTRACE) 1 MG tablet Take 1 mg by mouth Daily.     • levothyroxine (SYNTHROID, LEVOTHROID) 75 MCG tablet Take 1 tablet by mouth Daily. 90 tablet 3   • progesterone (PROMETRIUM) 100 MG capsule Take 100 mg by mouth Daily.     • vitamin D (ERGOCALCIFEROL) 1.25 MG (38877 UT) capsule capsule Take 50,000 Units by mouth 1 (One) Time Per Week.       No current facility-administered medications for this visit.          Objective      Vitals:    03/03/21 1126   BP: 120/78   Pulse: 78   Temp: 97.5 °F (36.4 °C)   SpO2: 98%   Weight: 83.1 kg (183 lb 3.2 oz)   Height: 180.3  "cm (71\")   Body mass index is 25.55 kg/m².  Physical Exam   Constitutional: She is oriented to person, place, and time. She appears well-developed and well-nourished.   HENT:   Head: Normocephalic and atraumatic.   Cardiovascular: Normal rate, regular rhythm and normal heart sounds.   Pulmonary/Chest: Effort normal and breath sounds normal.   Musculoskeletal:      Comments: Left antecubital fossa 3 cm soft tissue swelling - cyst.    Neurological: She is alert and oriented to person, place, and time.   Skin: Skin is warm and dry.   Psychiatric: Her behavior is normal. Judgment and thought content normal.       LABS AND IMAGING  No visits with results within 1 Month(s) from this visit.   Latest known visit with results is:   Office Visit on 09/01/2020   Component Date Value Ref Range Status   • TSH 09/01/2020 1.710  0.450 - 4.500 uIU/mL Final   • Free T4 09/01/2020 1.49  0.82 - 1.77 ng/dL Final   • 25 Hydroxy, Vitamin D 09/01/2020 35.3  30.0 - 100.0 ng/mL Final    Comment: Vitamin D deficiency has been defined by the [place] of  Medicine and an Endocrine Society practice guideline as a  level of serum 25-OH vitamin D less than 20 ng/mL (1,2).  The Endocrine Society went on to further define vitamin D  insufficiency as a level between 21 and 29 ng/mL (2).  1. IOM ([place] of Medicine). 2010. Dietary reference     intakes for calcium and D. [place]: The     National Academies Press.  2. Deepti MF, Anjelica NC, Brit COLE, et al.     Evaluation, treatment, and prevention of vitamin D     deficiency: an Endocrine Society clinical practice     guideline. JCEM. 2011 Jul; 96(7):1911-30.     • Glucose 09/01/2020 85  65 - 99 mg/dL Final   • BUN 09/01/2020 10  6 - 24 mg/dL Final   • Creatinine 09/01/2020 0.68  0.57 - 1.00 mg/dL Final   • eGFR Non  Am 09/01/2020 96  >59 mL/min/1.73 Final   • eGFR African Am 09/01/2020 111  >59 mL/min/1.73 Final   • BUN/Creatinine Ratio 09/01/2020 15  9 - 23 Final   • " Sodium 09/01/2020 142  134 - 144 mmol/L Final   • Potassium 09/01/2020 3.8  3.5 - 5.2 mmol/L Final   • Chloride 09/01/2020 103  96 - 106 mmol/L Final   • Total CO2 09/01/2020 24  20 - 29 mmol/L Final   • Calcium 09/01/2020 9.2  8.7 - 10.2 mg/dL Final   • Total Protein 09/01/2020 6.5  6.0 - 8.5 g/dL Final   • Albumin 09/01/2020 4.0  3.8 - 4.9 g/dL Final   • Globulin 09/01/2020 2.5  1.5 - 4.5 g/dL Final   • A/G Ratio 09/01/2020 1.6  1.2 - 2.2 Final   • Total Bilirubin 09/01/2020 0.6  0.0 - 1.2 mg/dL Final   • Alkaline Phosphatase 09/01/2020 88  39 - 117 IU/L Final   • AST (SGOT) 09/01/2020 19  0 - 40 IU/L Final   • ALT (SGPT) 09/01/2020 13  0 - 32 IU/L Final       1. Vitamin D deficiency    2. Acquired hypothyroidism    3. Ganglion of forearm, left        Assessment/Plan      Problem List Items Addressed This Visit        Other    Hypothyroidism    Relevant Medications    levothyroxine (SYNTHROID, LEVOTHROID) 75 MCG tablet    Other Relevant Orders    TSH    T4, Free    Ambulatory Referral to General Surgery    Vitamin D deficiency - Primary    Relevant Orders    Vitamin D 25 Hydroxy    Ambulatory Referral to General Surgery      Other Visit Diagnoses     Ganglion of forearm, left                PLAN  Continue same dose levothyroxine 75 µg. Repeat labs today     Start  vitamin D 50 000 IU weekly, repeat levels today.      I have sent referral for general surgery  For removal of the left antecubital cyst.     Follow-up in 6 months.

## 2021-03-04 LAB
25(OH)D3+25(OH)D2 SERPL-MCNC: 22.4 NG/ML (ref 30–100)
T4 FREE SERPL-MCNC: 1.16 NG/DL (ref 0.93–1.7)
TSH SERPL DL<=0.005 MIU/L-ACNC: 2.5 UIU/ML (ref 0.27–4.2)

## 2021-06-14 ENCOUNTER — LAB REQUISITION (OUTPATIENT)
Dept: LAB | Facility: HOSPITAL | Age: 61
End: 2021-06-14

## 2021-06-14 DIAGNOSIS — R22.32 LOCALIZED SWELLING, MASS AND LUMP, LEFT UPPER LIMB: ICD-10-CM

## 2021-06-14 PROCEDURE — 88304 TISSUE EXAM BY PATHOLOGIST: CPT | Performed by: SURGERY

## 2021-06-15 ENCOUNTER — HOSPITAL ENCOUNTER (EMERGENCY)
Facility: HOSPITAL | Age: 61
Discharge: HOME | End: 2021-06-15
Attending: EMERGENCY MEDICINE | Admitting: EMERGENCY MEDICINE

## 2021-06-15 VITALS
WEIGHT: 182 LBS | DIASTOLIC BLOOD PRESSURE: 92 MMHG | HEIGHT: 71 IN | RESPIRATION RATE: 16 BRPM | TEMPERATURE: 98.3 F | SYSTOLIC BLOOD PRESSURE: 174 MMHG | BODY MASS INDEX: 25.48 KG/M2 | HEART RATE: 78 BPM | OXYGEN SATURATION: 96 %

## 2021-06-15 DIAGNOSIS — L03.114 CELLULITIS OF LEFT ARM: Primary | ICD-10-CM

## 2021-06-15 LAB
ALBUMIN SERPL-MCNC: 3.9 G/DL (ref 3.5–5.2)
ALBUMIN/GLOB SERPL: 1.3 G/DL
ALP SERPL-CCNC: 104 U/L (ref 39–117)
ALT SERPL W P-5'-P-CCNC: 11 U/L (ref 1–33)
ANION GAP SERPL CALCULATED.3IONS-SCNC: 9 MMOL/L (ref 5–15)
AST SERPL-CCNC: 18 U/L (ref 1–32)
BASOPHILS # BLD AUTO: 0.03 10*3/MM3 (ref 0–0.2)
BASOPHILS NFR BLD AUTO: 0.4 % (ref 0–1.5)
BILIRUB SERPL-MCNC: 0.3 MG/DL (ref 0–1.2)
BUN SERPL-MCNC: 16 MG/DL (ref 8–23)
BUN/CREAT SERPL: 20.3 (ref 7–25)
CALCIUM SPEC-SCNC: 9.1 MG/DL (ref 8.6–10.5)
CHLORIDE SERPL-SCNC: 105 MMOL/L (ref 98–107)
CO2 SERPL-SCNC: 26 MMOL/L (ref 22–29)
CREAT SERPL-MCNC: 0.79 MG/DL (ref 0.57–1)
CRP SERPL-MCNC: 1.6 MG/DL (ref 0–0.5)
CYTO UR: NORMAL
DEPRECATED RDW RBC AUTO: 42.5 FL (ref 37–54)
EOSINOPHIL # BLD AUTO: 0.38 10*3/MM3 (ref 0–0.4)
EOSINOPHIL NFR BLD AUTO: 5.2 % (ref 0.3–6.2)
ERYTHROCYTE [DISTWIDTH] IN BLOOD BY AUTOMATED COUNT: 13.5 % (ref 12.3–15.4)
ERYTHROCYTE [SEDIMENTATION RATE] IN BLOOD: 14 MM/HR (ref 0–30)
GFR SERPL CREATININE-BSD FRML MDRD: 74 ML/MIN/1.73
GLOBULIN UR ELPH-MCNC: 3.1 GM/DL
GLUCOSE SERPL-MCNC: 102 MG/DL (ref 65–99)
HCT VFR BLD AUTO: 41 % (ref 34–46.6)
HGB BLD-MCNC: 13.3 G/DL (ref 12–15.9)
HOLD SPECIMEN: NORMAL
IMM GRANULOCYTES # BLD AUTO: 0.03 10*3/MM3 (ref 0–0.05)
IMM GRANULOCYTES NFR BLD AUTO: 0.4 % (ref 0–0.5)
LAB AP CASE REPORT: NORMAL
LAB AP CLINICAL INFORMATION: NORMAL
LYMPHOCYTES # BLD AUTO: 2.11 10*3/MM3 (ref 0.7–3.1)
LYMPHOCYTES NFR BLD AUTO: 28.7 % (ref 19.6–45.3)
MCH RBC QN AUTO: 28.4 PG (ref 26.6–33)
MCHC RBC AUTO-ENTMCNC: 32.4 G/DL (ref 31.5–35.7)
MCV RBC AUTO: 87.4 FL (ref 79–97)
MONOCYTES # BLD AUTO: 0.84 10*3/MM3 (ref 0.1–0.9)
MONOCYTES NFR BLD AUTO: 11.4 % (ref 5–12)
NEUTROPHILS NFR BLD AUTO: 3.97 10*3/MM3 (ref 1.7–7)
NEUTROPHILS NFR BLD AUTO: 53.9 % (ref 42.7–76)
NRBC BLD AUTO-RTO: 0 /100 WBC (ref 0–0.2)
PATH REPORT.FINAL DX SPEC: NORMAL
PATH REPORT.GROSS SPEC: NORMAL
PLATELET # BLD AUTO: 346 10*3/MM3 (ref 140–450)
PMV BLD AUTO: 9.4 FL (ref 6–12)
POTASSIUM SERPL-SCNC: 3.9 MMOL/L (ref 3.5–5.2)
PROT SERPL-MCNC: 7 G/DL (ref 6–8.5)
RBC # BLD AUTO: 4.69 10*6/MM3 (ref 3.77–5.28)
SODIUM SERPL-SCNC: 140 MMOL/L (ref 136–145)
WBC # BLD AUTO: 7.36 10*3/MM3 (ref 3.4–10.8)
WHOLE BLOOD HOLD SPECIMEN: NORMAL

## 2021-06-15 PROCEDURE — 80053 COMPREHEN METABOLIC PANEL: CPT | Performed by: PHYSICIAN ASSISTANT

## 2021-06-15 PROCEDURE — 99283 EMERGENCY DEPT VISIT LOW MDM: CPT

## 2021-06-15 PROCEDURE — 85025 COMPLETE CBC W/AUTO DIFF WBC: CPT | Performed by: PHYSICIAN ASSISTANT

## 2021-06-15 PROCEDURE — 86140 C-REACTIVE PROTEIN: CPT | Performed by: PHYSICIAN ASSISTANT

## 2021-06-15 PROCEDURE — 85652 RBC SED RATE AUTOMATED: CPT | Performed by: PHYSICIAN ASSISTANT

## 2021-06-15 RX ORDER — SULFAMETHOXAZOLE AND TRIMETHOPRIM 800; 160 MG/1; MG/1
1 TABLET ORAL 2 TIMES DAILY
Qty: 14 TABLET | Refills: 0 | Status: SHIPPED | OUTPATIENT
Start: 2021-06-15 | End: 2021-09-07

## 2021-06-15 RX ORDER — SODIUM CHLORIDE 0.9 % (FLUSH) 0.9 %
10 SYRINGE (ML) INJECTION AS NEEDED
Status: DISCONTINUED | OUTPATIENT
Start: 2021-06-15 | End: 2021-06-15 | Stop reason: HOSPADM

## 2021-06-15 RX ORDER — SULFAMETHOXAZOLE AND TRIMETHOPRIM 800; 160 MG/1; MG/1
1 TABLET ORAL ONCE
Status: COMPLETED | OUTPATIENT
Start: 2021-06-15 | End: 2021-06-15

## 2021-06-15 RX ADMIN — SULFAMETHOXAZOLE AND TRIMETHOPRIM 160 MG: 800; 160 TABLET ORAL at 20:56

## 2021-06-16 NOTE — ED PROVIDER NOTES
Subjective   7-year-old female that comes emerged part today with a red tender area over the left antecubital fossa.  She just had a lipoma removed yesterday by Dr. Pulido under procedural sedation.  She had some redness yesterday but today is doubled in size.  Is red it is tender and warm to touch.  She states the wound has not had any drainage.  She has no red streaks.  She is not had any fevers or chills myalgias or other symptoms associated with this.  She came to the emergency department today concerned that maybe she had an infection.  She is not immunocompromised.      History provided by:  Patient   used: No    Wound Infection  Location:  Left AC  Quality:  Warm tender red  Severity:  Moderate  Onset quality:  Sudden  Duration:  24 hours  Timing:  Constant  Progression:  Worsening  Chronicity:  New  Context:  Status post lipoma removal yesterday  Relieved by:  Nothing  Worsened by:  Nothing  Associated symptoms: rash    Associated symptoms: no abdominal pain, no congestion, no cough, no diarrhea, no fever, no headaches, no nausea, no rhinorrhea, no shortness of breath, no sore throat, no vomiting and no wheezing        Review of Systems   Constitutional: Negative for chills and fever.   HENT: Negative for congestion, rhinorrhea and sore throat.    Respiratory: Negative for cough, shortness of breath and wheezing.    Gastrointestinal: Negative for abdominal pain, diarrhea, nausea and vomiting.   Skin: Positive for rash.   Neurological: Negative for headaches.   Psychiatric/Behavioral: Negative.    All other systems reviewed and are negative.      Past Medical History:   Diagnosis Date   • GERD (gastroesophageal reflux disease)    • Hypothyroidism        Allergies   Allergen Reactions   • Demerol [Meperidine] Itching       Past Surgical History:   Procedure Laterality Date   • CHOLECYSTECTOMY     • CYST REMOVAL Left    • HX OVARIAN CYSTECTOMY Bilateral    • OTHER SURGICAL HISTORY      ear  pressure equalization tube, insertion, bilaterally   • TYMPANOPLASTY         Family History   Problem Relation Age of Onset   • Arthritis Mother    • Hypertension Mother    • Thyroid disease Mother    • Thyroid disease Daughter    • Breast cancer Neg Hx    • Ovarian cancer Neg Hx        Social History     Socioeconomic History   • Marital status:      Spouse name: Not on file   • Number of children: Not on file   • Years of education: Not on file   • Highest education level: Not on file   Tobacco Use   • Smoking status: Never Smoker   • Smokeless tobacco: Never Used   Substance and Sexual Activity   • Alcohol use: Yes     Comment: seldom, 1-2 drinks per year   • Drug use: No   • Sexual activity: Yes     Partners: Male           Objective   Physical Exam  Vitals and nursing note reviewed.   Constitutional:       General: She is not in acute distress.     Appearance: She is well-developed. She is not diaphoretic.   HENT:      Head: Normocephalic and atraumatic.      Nose: Nose normal.   Eyes:      General: No scleral icterus.     Conjunctiva/sclera: Conjunctivae normal.   Pulmonary:      Effort: Pulmonary effort is normal. No respiratory distress.   Musculoskeletal:         General: Normal range of motion.   Skin:     General: Skin is warm and dry.          Neurological:      Mental Status: She is alert and oriented to person, place, and time.   Psychiatric:         Behavior: Behavior normal.         Procedures           ED Course  ED Course as of Jun 16 2037   Tue Tung 15, 2021   1946 Spoke to Dr. Salgado he has been going start the patient on Bactrim Dr. Pulido should be in the office doing clinic tomorrow have her call the office and work in tomorrow so we can recheck this wound.    [SHAYNE]   2033 C-Reactive Protein(!): 1.60 [SHAYNE]      ED Course User Index  [SHAYNE] Charly Oliver PA                                   No results found for this or any previous visit (from the past 24 hour(s)).  Note: In addition to  "lab results from this visit, the labs listed above may include labs taken at another facility or during a different encounter within the last 24 hours. Please correlate lab times with ED admission and discharge times for further clarification of the services performed during this visit.    No orders to display     Vitals:    06/15/21 1911   BP: 174/92   BP Location: Left arm   Patient Position: Sitting   Pulse: 78   Resp: 16   Temp: 98.3 °F (36.8 °C)   TempSrc: Oral   SpO2: 96%   Weight: 82.6 kg (182 lb)   Height: 180.3 cm (71\")     Medications   sulfamethoxazole-trimethoprim (BACTRIM DS,SEPTRA DS) 800-160 MG per tablet 160 mg (160 mg Oral Given 6/15/21 2056)     ECG/EMG Results (last 24 hours)     ** No results found for the last 24 hours. **        No orders to display               MDM  Number of Diagnoses or Management Options  Cellulitis of left arm: new and requires workup     Amount and/or Complexity of Data Reviewed  Clinical lab tests: reviewed and ordered  Tests in the medicine section of CPT®: reviewed and ordered  Discuss the patient with other providers: yes    Patient Progress  Patient progress: stable      Final diagnoses:   Cellulitis of left arm       ED Disposition  ED Disposition     ED Disposition Condition Comment    Discharge Stable           Jaylon Pulido MD  [address] [facility] [address]  [place] [id number] [phone]               Medication List      New Prescriptions    sulfamethoxazole-trimethoprim 800-160 MG per tablet  Commonly known as: BACTRIM DS,SEPTRA DS  Take 1 tablet by mouth 2 (Two) Times a Day.           Where to Get Your Medications      These medications were sent to [id number] - [place], [place] - [address] AT [facility] [address] - [phone]  - [phone] FX  [address] [facility] [facility] [address]    Phone: [phone]   · sulfamethoxazole-trimethoprim 800-160 MG per tablet          Charly Oliver, " PA  06/16/21 2037

## 2021-07-10 DIAGNOSIS — E03.9 ACQUIRED HYPOTHYROIDISM: ICD-10-CM

## 2021-07-12 RX ORDER — LEVOTHYROXINE SODIUM 0.07 MG/1
75 TABLET ORAL DAILY
Qty: 30 TABLET | Refills: 5 | Status: SHIPPED | OUTPATIENT
Start: 2021-07-12 | End: 2021-10-30

## 2021-08-13 RX ORDER — ERGOCALCIFEROL 1.25 MG/1
50000 CAPSULE ORAL WEEKLY
Qty: 5 CAPSULE | Refills: 1 | Status: SHIPPED | OUTPATIENT
Start: 2021-08-13 | End: 2021-08-13

## 2021-08-15 RX ORDER — ERGOCALCIFEROL 1.25 MG/1
CAPSULE ORAL
Qty: 13 CAPSULE | Refills: 1 | Status: SHIPPED | OUTPATIENT
Start: 2021-08-15 | End: 2022-04-26 | Stop reason: SDUPTHER

## 2021-09-07 ENCOUNTER — OFFICE VISIT (OUTPATIENT)
Dept: INTERNAL MEDICINE | Facility: CLINIC | Age: 61
End: 2021-09-07

## 2021-09-07 ENCOUNTER — TELEPHONE (OUTPATIENT)
Dept: INTERNAL MEDICINE | Facility: CLINIC | Age: 61
End: 2021-09-07

## 2021-09-07 VITALS
HEART RATE: 75 BPM | BODY MASS INDEX: 24.97 KG/M2 | WEIGHT: 179 LBS | SYSTOLIC BLOOD PRESSURE: 126 MMHG | TEMPERATURE: 97.1 F | OXYGEN SATURATION: 97 % | DIASTOLIC BLOOD PRESSURE: 80 MMHG

## 2021-09-07 DIAGNOSIS — J22 LOWER RESPIRATORY TRACT INFECTION: Primary | ICD-10-CM

## 2021-09-07 DIAGNOSIS — J06.9 UPPER RESPIRATORY TRACT INFECTION, UNSPECIFIED TYPE: ICD-10-CM

## 2021-09-07 DIAGNOSIS — H66.001 NON-RECURRENT ACUTE SUPPURATIVE OTITIS MEDIA OF RIGHT EAR WITHOUT SPONTANEOUS RUPTURE OF TYMPANIC MEMBRANE: ICD-10-CM

## 2021-09-07 PROCEDURE — 99213 OFFICE O/P EST LOW 20 MIN: CPT | Performed by: NURSE PRACTITIONER

## 2021-09-07 RX ORDER — AZITHROMYCIN 250 MG/1
TABLET, FILM COATED ORAL
Qty: 6 TABLET | Refills: 0 | Status: SHIPPED | OUTPATIENT
Start: 2021-09-07 | End: 2021-10-22

## 2021-09-07 RX ORDER — PROGESTERONE 200 MG/1
200 CAPSULE ORAL DAILY
COMMUNITY
Start: 2021-08-13

## 2021-09-07 RX ORDER — DEXTROMETHORPHAN HYDROBROMIDE AND PROMETHAZINE HYDROCHLORIDE 15; 6.25 MG/5ML; MG/5ML
5 SYRUP ORAL NIGHTLY PRN
Qty: 60 ML | Refills: 0 | Status: SHIPPED | OUTPATIENT
Start: 2021-09-07 | End: 2021-10-22

## 2021-09-07 NOTE — PROGRESS NOTES
Chief Complaint   Patient presents with   • Hoarse     sunday, monday and today   • URI     about 2-3 days   • Abdominal Pain     outer left side of rib cage, started this morning   • Cough     productive, brown and yellow, 2-3 days       History of Present Illness    61 y.o.female presents for URI, cough.  Had traveled this weekend stayed in cabin; outside a lot. Car broke down outside even more. Next day developed congestion sore throat hoarseness. Now with cough productive with brown yellow secretions 2-3 days. Chest hurts on left lateral side with cough and movement. + Ear pain.  No fever.   Had covid in jan with pneumonia on that side.    Review of Systems   Constitutional: Negative for chills and fever.   HENT: Positive for congestion, ear pain, postnasal drip, rhinorrhea and sore throat. Negative for sinus pressure and sneezing.    Respiratory: Positive for cough and chest tightness. Negative for shortness of breath.    Musculoskeletal: Negative for myalgias.   Neurological: Negative for headache.         [facility]  The following portions of the patient's history were reviewed and updated as appropriate: allergies, current medications, past family history, past medical history, past social history, past surgical history and problem list.     Past Medical History:   Diagnosis Date   • GERD (gastroesophageal reflux disease)    • Hypothyroidism       Allergies   Allergen Reactions   • Demerol [Meperidine] Itching      Social History     Tobacco Use   • Smoking status: Never Smoker   • Smokeless tobacco: Never Used   Vaping Use   • Vaping Use: Never used   Substance Use Topics   • Alcohol use: Yes     Comment: seldom, 1-2 drinks per year   • Drug use: No     Past Surgical History:   Procedure Laterality Date   • CHOLECYSTECTOMY     • CYST REMOVAL Left    • HX OVARIAN CYSTECTOMY Bilateral    • OTHER SURGICAL HISTORY      ear pressure equalization tube, insertion, bilaterally   • TYMPANOPLASTY        Family History    Problem Relation Age of Onset   • Arthritis Mother    • Hypertension Mother    • Thyroid disease Mother    • Thyroid disease Daughter    • Breast cancer Neg Hx    • Ovarian cancer Neg Hx            Current Outpatient Medications:   •  estradiol (ESTRACE) 1 MG tablet, Take 1 mg by mouth Daily., Disp: , Rfl:   •  levothyroxine (SYNTHROID, LEVOTHROID) 75 MCG tablet, TAKE 1 TABLET BY MOUTH DAILY, Disp: 30 tablet, Rfl: 5  •  Progesterone (PROMETRIUM) 200 MG capsule, Take 200 mg by mouth Daily., Disp: , Rfl:   •  vitamin D (ERGOCALCIFEROL) 1.25 MG (65745 UT) capsule capsule, TAKE 1 CAPSULE BY MOUTH 1 TIME EVERY WEEK, Disp: 13 capsule, Rfl: 1    VITALS:  /80   Pulse 75   Temp 97.1 °F (36.2 °C)   Wt 81.2 kg (179 lb)   SpO2 97%   BMI 24.97 kg/m²     Physical Exam  Constitutional:       General: She is not in acute distress.     Appearance: She is not diaphoretic.   HENT:      Head: Normocephalic.      Right Ear: External ear normal. Swelling present. No drainage. A middle ear effusion is present. Tympanic membrane is injected and erythematous. Tympanic membrane is not perforated or bulging.      Left Ear: Ear canal and external ear normal. A middle ear effusion is present. Tympanic membrane is not injected, perforated or bulging.      Nose: Congestion and rhinorrhea present.      Mouth/Throat:      Mouth: Mucous membranes are moist.      Pharynx: No oropharyngeal exudate or posterior oropharyngeal erythema.      Comments: Audible hoarseness  Eyes:      General:         Right eye: No discharge.         Left eye: No discharge.      Extraocular Movements: Extraocular movements intact.      Conjunctiva/sclera: Conjunctivae normal.      Pupils: Pupils are equal, round, and reactive to light.   Cardiovascular:      Rate and Rhythm: Normal rate and regular rhythm.      Heart sounds: Normal heart sounds.   Pulmonary:      Effort: Pulmonary effort is normal.      Breath sounds: Examination of the left-lower field reveals  rales. Rales present.   Chest:      Chest wall: No tenderness.   Skin:     General: Skin is warm and dry.   Neurological:      General: No focal deficit present.      Mental Status: She is alert and oriented to person, place, and time.   Psychiatric:         Mood and Affect: Mood normal.         Result Review :            Assessment and Plan    Diagnoses and all orders for this visit:    1. Lower respiratory tract infection (Primary)  -     azithromycin (ZITHROMAX) 250 MG tablet; Take 2 tablets the first day, then 1 tablet daily for 4 days.  Dispense: 6 tablet; Refill: 0  -     promethazine-dextromethorphan (PROMETHAZINE-DM) 6.25-15 MG/5ML syrup; Take 5 mL by mouth At Night As Needed for Cough.  Dispense: 60 mL; Refill: 0    2. Non-recurrent acute suppurative otitis media of right ear without spontaneous rupture of tympanic membrane  -     azithromycin (ZITHROMAX) 250 MG tablet; Take 2 tablets the first day, then 1 tablet daily for 4 days.  Dispense: 6 tablet; Refill: 0  -     neomycin-polymyxin-hydrocortisone (CORTISPORIN) 3.5-48949-5 otic solution; Administer 3 drops to the right ear 4 (Four) Times a Day.  Dispense: 10 mL; Refill: 0    3. Upper respiratory tract infection, unspecified type  -     azithromycin (ZITHROMAX) 250 MG tablet; Take 2 tablets the first day, then 1 tablet daily for 4 days.  Dispense: 6 tablet; Refill: 0    Recommendations:  Oral antihistamine such as allegra or claritin or zyrtec: one tablet by mouth once daily  Nasal steroid spray such as flonase or Nasacort: 1 spray each nostril daily.  Mucinex also known as guaifenesin can help to loosen chest secretions.  Drink plenty water which helps to loosen secretions.    If worsening of symptoms or no improvement in symptoms or fever > 101.5 patient should contact our office for further evaluation treatment or seek urgent care.    I discussed the patients findings and my recommendations with patient.  Patient was encouraged to keep me informed of  any acute changes, lack of improvement, or any new concerning symptoms.  Patient voiced understanding of all instructions and denied further questions.      Follow Up   Return if symptoms worsen or fail to improve.      Electronically signed by:    JOSE LUIS Belcher  09/07/2021

## 2021-09-07 NOTE — PATIENT INSTRUCTIONS
Allegra or claritin or zyrtec; choose one. Take 1 tab daily to help dry up fluid behind ear drum.    mucinex to help loosen secretions; take 1-2 tabs up to 2 times daily.

## 2021-09-07 NOTE — TELEPHONE ENCOUNTER
PT WANTED TO MAKE SURE THAT AMPARO MARIE CALLS IN S PRESCRIPTION FOR EAR DROPS, PT WAS SEEN TODAY 9-7-2021

## 2021-09-10 ENCOUNTER — TELEPHONE (OUTPATIENT)
Dept: INTERNAL MEDICINE | Facility: CLINIC | Age: 61
End: 2021-09-10

## 2021-09-10 NOTE — TELEPHONE ENCOUNTER
Caller: China Gomes    Relationship: Self    Best call back number: [phone]    What medication are you requesting: ALTERNATE MEDICATION     What are your current symptoms: DEVELOPED A FEVER 09/10, PAIN IN SIDE    How long have you been experiencing symptoms: NA    Have you had these symptoms before:    [] Yes  [] No    Have you been treated for these symptoms before:   [] Yes  [] No    If a prescription is needed, what is your preferred pharmacy and phone number: [id number] - [facility] [address] [facility] [address] - [phone] [facility] [phone]      Additional notes:  PATIENT STATES SHE DOES NOT FEEL ANY WORSE OR BETTER BUT STATES AMPARO ASKED HER TO LET HER KNOW IF SHE DEVELOPS A FEVER BECAUSE SHE MAY NEED A DIFFERENT ANTIBIOTIC

## 2021-09-13 NOTE — TELEPHONE ENCOUNTER
LVM to let Pt know checking in to see if she is feeling any better after finishing Z-ghislaine. Office number given

## 2021-10-22 ENCOUNTER — OFFICE VISIT (OUTPATIENT)
Dept: ENDOCRINOLOGY | Facility: CLINIC | Age: 61
End: 2021-10-22

## 2021-10-22 ENCOUNTER — LAB (OUTPATIENT)
Dept: LAB | Facility: HOSPITAL | Age: 61
End: 2021-10-22

## 2021-10-22 VITALS
SYSTOLIC BLOOD PRESSURE: 122 MMHG | HEIGHT: 71 IN | DIASTOLIC BLOOD PRESSURE: 78 MMHG | BODY MASS INDEX: 26.39 KG/M2 | HEART RATE: 75 BPM | WEIGHT: 188.5 LBS | OXYGEN SATURATION: 99 %

## 2021-10-22 DIAGNOSIS — E55.9 VITAMIN D DEFICIENCY: ICD-10-CM

## 2021-10-22 DIAGNOSIS — E03.9 ACQUIRED HYPOTHYROIDISM: Primary | ICD-10-CM

## 2021-10-22 PROCEDURE — 99214 OFFICE O/P EST MOD 30 MIN: CPT | Performed by: INTERNAL MEDICINE

## 2021-10-22 NOTE — PROGRESS NOTES
"Hypothyroidism (Follow Up)    Subjective    China Gomes is a 61 y.o. female. she is here today for follow-up for evaluation of   Hypothyroidism, diagnosed in 2012.   Follow-up for hypothyroidism. Patient states she feels healthy overall. She also feels mildly fatigued.   On levothyroxine and the dose increased to 75 mcg in 2/2018     Vit D deficiency - level was 5.8. In 2/2018 - she completed 3 months of high dose therapy, then discontinued. Then she ran out       Review of Systems  Review of Systems   Constitutional: Positive for fatigue. Negative for appetite change, unexpected weight gain and unexpected weight loss.   HENT: Negative.    Eyes: Negative.    Respiratory: Negative for cough and shortness of breath.    Cardiovascular: Negative for chest pain and palpitations.   Gastrointestinal: Negative for abdominal pain and constipation.   Endocrine: Positive for cold intolerance.   Genitourinary: Negative.    Musculoskeletal: Negative.    Skin: Positive for dry skin. Negative for rash.   Neurological: Negative for numbness and headache.   Psychiatric/Behavioral: Negative for sleep disturbance and depressed mood. The patient is not nervous/anxious.      Current medications:  Current Outpatient Medications   Medication Sig Dispense Refill   • estradiol (ESTRACE) 1 MG tablet Take 1 mg by mouth Daily.     • levothyroxine (SYNTHROID, LEVOTHROID) 75 MCG tablet TAKE 1 TABLET BY MOUTH DAILY 30 tablet 5   • Progesterone (PROMETRIUM) 200 MG capsule Take 200 mg by mouth Daily.     • vitamin D (ERGOCALCIFEROL) 1.25 MG (51552 UT) capsule capsule TAKE 1 CAPSULE BY MOUTH 1 TIME EVERY WEEK 13 capsule 1     No current facility-administered medications for this visit.         Objective      Vitals:    10/22/21 1556   BP: 122/78   Pulse: 75   SpO2: 99%   Weight: 85.5 kg (188 lb 8 oz)   Height: 180.3 cm (71\")   Body mass index is 26.29 kg/m².  Physical Exam   Constitutional: She is oriented to person, place, and time. She " appears well-developed and well-nourished.   HENT:   Head: Normocephalic and atraumatic.   Cardiovascular: Normal rate, regular rhythm and normal heart sounds.   Pulmonary/Chest: Effort normal and breath sounds normal.   Neurological: She is alert and oriented to person, place, and time.   Skin: Skin is warm and dry.   Psychiatric: Her behavior is normal. Judgment and thought content normal.       LABS AND IMAGING  No visits with results within 1 Month(s) from this visit.   Latest known visit with results is:   Admission on 06/15/2021, Discharged on 06/15/2021   Component Date Value Ref Range Status   • Extra Tube 06/15/2021 Hold for add-ons.   Final    Auto resulted.   • Extra Tube 06/15/2021 hold for add-on   Final    Auto resulted   • Extra Tube 06/15/2021 Hold for add-ons.   Final    Auto resulted.   • Extra Tube 06/15/2021 Hold for add-ons.   Final    Auto resulted.   • Glucose 06/15/2021 102* 65 - 99 mg/dL Final   • BUN 06/15/2021 16  8 - 23 mg/dL Final   • Creatinine 06/15/2021 0.79  0.57 - 1.00 mg/dL Final   • Sodium 06/15/2021 140  136 - 145 mmol/L Final   • Potassium 06/15/2021 3.9  3.5 - 5.2 mmol/L Final    Slight hemolysis detected by analyzer. Results may be affected.   • Chloride 06/15/2021 105  98 - 107 mmol/L Final   • CO2 06/15/2021 26.0  22.0 - 29.0 mmol/L Final   • Calcium 06/15/2021 9.1  8.6 - 10.5 mg/dL Final   • Total Protein 06/15/2021 7.0  6.0 - 8.5 g/dL Final   • Albumin 06/15/2021 3.90  3.50 - 5.20 g/dL Final   • ALT (SGPT) 06/15/2021 11  1 - 33 U/L Final   • AST (SGOT) 06/15/2021 18  1 - 32 U/L Final   • Alkaline Phosphatase 06/15/2021 104  39 - 117 U/L Final   • Total Bilirubin 06/15/2021 0.3  0.0 - 1.2 mg/dL Final   • eGFR Non  Amer 06/15/2021 74  >60 mL/min/1.73 Final   • Globulin 06/15/2021 3.1  gm/dL Final   • A/G Ratio 06/15/2021 1.3  g/dL Final   • BUN/Creatinine Ratio 06/15/2021 20.3  7.0 - 25.0 Final   • Anion Gap 06/15/2021 9.0  5.0 - 15.0 mmol/L Final   • Sed Rate  06/15/2021 14  0 - 30 mm/hr Final   • C-Reactive Protein 06/15/2021 1.60* 0.00 - 0.50 mg/dL Final   • WBC 06/15/2021 7.36  3.40 - 10.80 10*3/mm3 Final   • RBC 06/15/2021 4.69  3.77 - 5.28 10*6/mm3 Final   • Hemoglobin 06/15/2021 13.3  12.0 - 15.9 g/dL Final   • Hematocrit 06/15/2021 41.0  34.0 - 46.6 % Final   • MCV 06/15/2021 87.4  79.0 - 97.0 fL Final   • MCH 06/15/2021 28.4  26.6 - 33.0 pg Final   • MCHC 06/15/2021 32.4  31.5 - 35.7 g/dL Final   • RDW 06/15/2021 13.5  12.3 - 15.4 % Final   • RDW-SD 06/15/2021 42.5  37.0 - 54.0 fl Final   • MPV 06/15/2021 9.4  6.0 - 12.0 fL Final   • Platelets 06/15/2021 346  140 - 450 10*3/mm3 Final   • Neutrophil % 06/15/2021 53.9  42.7 - 76.0 % Final   • Lymphocyte % 06/15/2021 28.7  19.6 - 45.3 % Final   • Monocyte % 06/15/2021 11.4  5.0 - 12.0 % Final   • Eosinophil % 06/15/2021 5.2  0.3 - 6.2 % Final   • Basophil % 06/15/2021 0.4  0.0 - 1.5 % Final   • Immature Grans % 06/15/2021 0.4  0.0 - 0.5 % Final   • Neutrophils, Absolute 06/15/2021 3.97  1.70 - 7.00 10*3/mm3 Final   • Lymphocytes, Absolute 06/15/2021 2.11  0.70 - 3.10 10*3/mm3 Final   • Monocytes, Absolute 06/15/2021 0.84  0.10 - 0.90 10*3/mm3 Final   • Eosinophils, Absolute 06/15/2021 0.38  0.00 - 0.40 10*3/mm3 Final   • Basophils, Absolute 06/15/2021 0.03  0.00 - 0.20 10*3/mm3 Final   • Immature Grans, Absolute 06/15/2021 0.03  0.00 - 0.05 10*3/mm3 Final   • nRBC 06/15/2021 0.0  0.0 - 0.2 /100 WBC Final       1. Acquired hypothyroidism    2. Vitamin D deficiency        Assessment/Plan      Problem List Items Addressed This Visit        Other    Hypothyroidism - Primary    Relevant Orders    TSH    T4, Free    Vitamin D deficiency    Relevant Orders    Vitamin D 25 Hydroxy            PLAN  Continue same dose levothyroxine 75 µg. Repeat labs      Cont  vitamin D 50 000 IU weekly. Repeat labs      Follow-up in 6 months.

## 2021-10-25 LAB
25(OH)D3+25(OH)D2 SERPL-MCNC: 24.1 NG/ML (ref 30–100)
T4 FREE SERPL-MCNC: 1.1 NG/DL (ref 0.82–1.77)
TSH SERPL DL<=0.005 MIU/L-ACNC: 4.83 UIU/ML (ref 0.45–4.5)

## 2021-10-30 DIAGNOSIS — E03.9 ACQUIRED HYPOTHYROIDISM: ICD-10-CM

## 2021-10-30 RX ORDER — LEVOTHYROXINE SODIUM 88 UG/1
88 TABLET ORAL DAILY
Qty: 30 TABLET | Refills: 6 | Status: SHIPPED | OUTPATIENT
Start: 2021-10-30 | End: 2022-04-26 | Stop reason: SDUPTHER

## 2022-04-26 ENCOUNTER — LAB (OUTPATIENT)
Dept: LAB | Facility: HOSPITAL | Age: 62
End: 2022-04-26

## 2022-04-26 ENCOUNTER — OFFICE VISIT (OUTPATIENT)
Dept: ENDOCRINOLOGY | Facility: CLINIC | Age: 62
End: 2022-04-26

## 2022-04-26 VITALS
OXYGEN SATURATION: 98 % | WEIGHT: 184.5 LBS | HEIGHT: 71 IN | DIASTOLIC BLOOD PRESSURE: 76 MMHG | SYSTOLIC BLOOD PRESSURE: 116 MMHG | HEART RATE: 78 BPM | BODY MASS INDEX: 25.83 KG/M2

## 2022-04-26 DIAGNOSIS — E55.9 VITAMIN D DEFICIENCY: ICD-10-CM

## 2022-04-26 DIAGNOSIS — E03.9 ACQUIRED HYPOTHYROIDISM: Primary | ICD-10-CM

## 2022-04-26 PROCEDURE — 99213 OFFICE O/P EST LOW 20 MIN: CPT | Performed by: INTERNAL MEDICINE

## 2022-04-26 RX ORDER — ERGOCALCIFEROL 1.25 MG/1
50000 CAPSULE ORAL WEEKLY
Qty: 13 CAPSULE | Refills: 3 | Status: SHIPPED | OUTPATIENT
Start: 2022-04-26

## 2022-04-26 RX ORDER — LEVOTHYROXINE SODIUM 88 UG/1
88 TABLET ORAL DAILY
Qty: 90 TABLET | Refills: 3 | Status: SHIPPED | OUTPATIENT
Start: 2022-04-26

## 2022-04-26 NOTE — PROGRESS NOTES
"Hypothyroidism (Follow UP)    Subjective    China Gomes is a 62 y.o. female. she is here today for follow-up for evaluation of   Hypothyroidism, diagnosed in 2012.   Follow-up for hypothyroidism. Patient states she feels healthy overall. She also feels mildly fatigued.   On levothyroxine and the dose increased to 88 mcg and she felt much better - hair is growing back and energy level is better      Vit D deficiency - level was 5.8. In 2/2018 - she is taking high dose weekly supplement - often skips, so probably every other week.       Review of Systems  Review of Systems   Constitutional: Negative for appetite change, unexpected weight gain and unexpected weight loss.   HENT: Negative.    Eyes: Negative.    Respiratory: Negative for cough and shortness of breath.    Cardiovascular: Negative for chest pain and palpitations.   Gastrointestinal: Negative for abdominal pain and constipation.   Genitourinary: Negative.    Musculoskeletal: Negative.    Skin: Negative for rash.   Neurological: Negative for numbness and headache.   Psychiatric/Behavioral: Negative for sleep disturbance and depressed mood. The patient is not nervous/anxious.      Current medications:  Current Outpatient Medications   Medication Sig Dispense Refill   • estradiol (ESTRACE) 1 MG tablet Take 1 mg by mouth Daily.     • levothyroxine (SYNTHROID, LEVOTHROID) 88 MCG tablet Take 1 tablet by mouth Daily. 30 tablet 6   • Progesterone (PROMETRIUM) 200 MG capsule Take 200 mg by mouth Daily.     • vitamin D (ERGOCALCIFEROL) 1.25 MG (46102 UT) capsule capsule TAKE 1 CAPSULE BY MOUTH 1 TIME EVERY WEEK 13 capsule 1     No current facility-administered medications for this visit.         Objective      Vitals:    04/26/22 0850   BP: 116/76   Pulse: 78   SpO2: 98%   Weight: 83.7 kg (184 lb 8 oz)   Height: 180.3 cm (71\")   Body mass index is 25.73 kg/m².  Physical Exam   Constitutional: She is oriented to person, place, and time. She appears " well-developed and well-nourished.   HENT:   Head: Normocephalic and atraumatic.   Cardiovascular: Normal rate, regular rhythm, normal heart sounds and normal pulses.   Pulmonary/Chest: Effort normal and breath sounds normal.   Musculoskeletal: No swelling.   Neurological: She is alert and oriented to person, place, and time.   Skin: Skin is warm and dry.   Psychiatric: Her behavior is normal. Judgment and thought content normal.       LABS AND IMAGING  No visits with results within 1 Month(s) from this visit.   Latest known visit with results is:   Office Visit on 10/22/2021   Component Date Value Ref Range Status   • TSH 10/22/2021 4.830 (A) 0.450 - 4.500 uIU/mL Final   • Free T4 10/22/2021 1.10  0.82 - 1.77 ng/dL Final   • 25 Hydroxy, Vitamin D 10/22/2021 24.1 (A) 30.0 - 100.0 ng/mL Final    Comment: Vitamin D deficiency has been defined by the [place] of  [employer] and an Endocrine Society practice guideline as a  level of serum 25-OH vitamin D less than 20 ng/mL (1,2).  The Endocrine Society went on to further define vitamin D  insufficiency as a level between 21 and 29 ng/mL (2).  1. IOM ([place] [employer]). 2010. Dietary reference     intakes for calcium and D. [place]: The     National Academies Press.  2. Deepti WHITING, Anjelica SCOTT, Brit COLE, et al.     Evaluation, treatment, and prevention of vitamin D     deficiency: an Endocrine Society clinical practice     guideline. JCEM. 2011 Jul; 96(7):1911-30.         1. Acquired hypothyroidism    2. Vitamin D deficiency        Assessment/Plan      Problem List Items Addressed This Visit        Other    Hypothyroidism - Primary    Vitamin D deficiency            PLAN  Continue same dose levothyroxine 88 µg. Repeat labs      Cont  vitamin D 50 000 IU weekly. Repeat labs      Follow-up in 8 months.

## 2022-04-27 LAB
25(OH)D3+25(OH)D2 SERPL-MCNC: 19.9 NG/ML (ref 30–100)
T4 FREE SERPL-MCNC: 1.42 NG/DL (ref 0.93–1.7)
TSH SERPL DL<=0.005 MIU/L-ACNC: 1.67 UIU/ML (ref 0.27–4.2)

## 2022-08-23 ENCOUNTER — TELEPHONE (OUTPATIENT)
Dept: INTERNAL MEDICINE | Facility: CLINIC | Age: 62
End: 2022-08-23
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
## 2022-08-24 ENCOUNTER — TELEPHONE (OUTPATIENT)
Dept: INTERNAL MEDICINE | Facility: CLINIC | Age: 62
End: 2022-08-24
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
## 2022-09-15 ENCOUNTER — OFFICE VISIT (OUTPATIENT)
Dept: INTERNAL MEDICINE | Facility: CLINIC | Age: 62
End: 2022-09-15

## 2022-09-15 VITALS
RESPIRATION RATE: 16 BRPM | WEIGHT: 183 LBS | OXYGEN SATURATION: 95 % | HEART RATE: 85 BPM | HEIGHT: 71 IN | DIASTOLIC BLOOD PRESSURE: 80 MMHG | TEMPERATURE: 99.3 F | BODY MASS INDEX: 25.62 KG/M2 | SYSTOLIC BLOOD PRESSURE: 120 MMHG

## 2022-09-15 DIAGNOSIS — M79.601 PAIN AND SWELLING OF RIGHT UPPER EXTREMITY: ICD-10-CM

## 2022-09-15 DIAGNOSIS — M79.89 PAIN AND SWELLING OF RIGHT UPPER EXTREMITY: ICD-10-CM
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
## 2022-09-15 PROCEDURE — 99213 OFFICE O/P EST LOW 20 MIN: CPT | Performed by: NURSE PRACTITIONER
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
## 2022-10-04 ENCOUNTER — HOSPITAL ENCOUNTER (OUTPATIENT)
Dept: GENERAL RADIOLOGY | Facility: HOSPITAL | Age: 62
Discharge: HOME | End: 2022-10-04
Admitting: NURSE PRACTITIONER
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
## 2022-10-04 DIAGNOSIS — M79.89 PAIN AND SWELLING OF RIGHT UPPER EXTREMITY: ICD-10-CM

## 2022-10-04 DIAGNOSIS — M79.601 PAIN AND SWELLING OF RIGHT UPPER EXTREMITY: ICD-10-CM

## 2022-10-04 PROCEDURE — 73010 X-RAY EXAM OF SHOULDER BLADE: CPT

## 2022-10-04 PROCEDURE — 73030 X-RAY EXAM OF SHOULDER: CPT

## 2022-10-04 PROCEDURE — 73060 X-RAY EXAM OF HUMERUS: CPT

## 2022-10-13 ENCOUNTER — OFFICE VISIT (OUTPATIENT)
Dept: INTERNAL MEDICINE | Facility: CLINIC | Age: 62
End: 2022-10-13

## 2022-10-13 DIAGNOSIS — M79.89 PAIN AND SWELLING OF RIGHT UPPER EXTREMITY: ICD-10-CM

## 2022-10-13 DIAGNOSIS — M79.601 PAIN AND SWELLING OF RIGHT UPPER EXTREMITY: ICD-10-CM
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
## 2022-10-13 PROCEDURE — 99213 OFFICE O/P EST LOW 20 MIN: CPT | Performed by: NURSE PRACTITIONER

## 2022-10-13 RX ORDER — FAMOTIDINE 20 MG/1
20 TABLET, FILM COATED ORAL NIGHTLY PRN
Qty: 30 TABLET | Refills: 0 | Status: SHIPPED | OUTPATIENT
Start: 2022-10-13 | End: 2022-10-13

## 2022-10-13 RX ORDER — FAMOTIDINE 20 MG/1
TABLET, FILM COATED ORAL
Qty: 90 TABLET | Refills: 0 | Status: SHIPPED | OUTPATIENT
Start: 2022-10-13

## 2022-10-13 RX ORDER — NAPROXEN 500 MG/1
500 TABLET ORAL 2 TIMES DAILY WITH MEALS
Qty: 60 TABLET | Refills: 0 | Status: SHIPPED | OUTPATIENT
Start: 2022-10-13 | End: 2022-11-12
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
## 2022-10-21 ENCOUNTER — TREATMENT (OUTPATIENT)
Dept: PHYSICAL THERAPY | Facility: CLINIC | Age: 62
End: 2022-10-21

## 2022-10-21 DIAGNOSIS — M25.511 ACUTE PAIN OF RIGHT SHOULDER: Primary | ICD-10-CM

## 2022-10-21 PROCEDURE — 97161 PT EVAL LOW COMPLEX 20 MIN: CPT | Performed by: PHYSICAL THERAPIST
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
## 2022-10-24 ENCOUNTER — TREATMENT (OUTPATIENT)
Dept: PHYSICAL THERAPY | Facility: CLINIC | Age: 62
End: 2022-10-24

## 2022-10-24 DIAGNOSIS — M25.511 ACUTE PAIN OF RIGHT SHOULDER: Primary | ICD-10-CM

## 2022-10-24 PROCEDURE — 97140 MANUAL THERAPY 1/> REGIONS: CPT | Performed by: PHYSICAL THERAPIST

## 2022-10-24 PROCEDURE — 97110 THERAPEUTIC EXERCISES: CPT | Performed by: PHYSICAL THERAPIST

## 2022-10-24 PROCEDURE — 97112 NEUROMUSCULAR REEDUCATION: CPT | Performed by: PHYSICAL THERAPIST

## 2022-10-27 ENCOUNTER — TREATMENT (OUTPATIENT)
Dept: PHYSICAL THERAPY | Facility: CLINIC | Age: 62
End: 2022-10-27

## 2022-10-27 DIAGNOSIS — M25.511 ACUTE PAIN OF RIGHT SHOULDER: Primary | ICD-10-CM

## 2022-10-27 PROCEDURE — 97140 MANUAL THERAPY 1/> REGIONS: CPT | Performed by: PHYSICAL THERAPIST

## 2022-10-27 PROCEDURE — 97110 THERAPEUTIC EXERCISES: CPT | Performed by: PHYSICAL THERAPIST

## 2022-10-27 PROCEDURE — 97112 NEUROMUSCULAR REEDUCATION: CPT | Performed by: PHYSICAL THERAPIST

## 2022-10-27 NOTE — PROGRESS NOTES
Physical Therapy Daily Progress Note    [address]  [place] [address]      Patient: China Gomes   : 1960  Diagnosis/ICD-10 Code:  Acute pain of right shoulder [M25.511]  Referring practitioner: JOSE LUIS Shah  Date of Initial Visit: Type: THERAPY  Noted: 10/21/2022  Today's Date: 10/27/2022  Patient seen for 3 sessions           Patient reports: feeling more sore but looser as well. She feels like the soreness is not bad.      Objective   See Exercise, Manual, and Modality Logs for complete treatment.       Assessment/Plan  Patient demonstrates improved cervical and shoulder AROM prior to treatment and following manual interventions. She tolerated increased pressure during thoracic mobilization, reporting less pain as well. Correction of existing exercises reduced c/o upper trap strain. Added thoracic ext to HEP.            Timed:  Manual Therapy:    23     mins  63545;  Therapeutic Exercise:    20     mins  87041;     Neuromuscular Eugenia:   10    mins  19184;    Therapeutic Activity:     0     mins  71243;     Gait Trainin     mins  70884;     Ultrasound:     0     mins  62453;    Electrical Stimulation:    0     mins  25539 ( );    Untimed:  Electrical Stimulation:    0     mins  73593 ( );  Mechanical Traction:    0     mins  29194;     Timed Treatment:   53   mins   Total Treatment:     53   mins    Jostin Cruz PT  Physical Therapist

## 2022-10-31 ENCOUNTER — TREATMENT (OUTPATIENT)
Dept: PHYSICAL THERAPY | Facility: CLINIC | Age: 62
End: 2022-10-31

## 2022-10-31 DIAGNOSIS — M25.511 ACUTE PAIN OF RIGHT SHOULDER: Primary | ICD-10-CM

## 2022-10-31 PROCEDURE — 97140 MANUAL THERAPY 1/> REGIONS: CPT | Performed by: PHYSICAL THERAPIST

## 2022-10-31 PROCEDURE — 97112 NEUROMUSCULAR REEDUCATION: CPT | Performed by: PHYSICAL THERAPIST

## 2022-10-31 PROCEDURE — 97110 THERAPEUTIC EXERCISES: CPT | Performed by: PHYSICAL THERAPIST

## 2022-10-31 NOTE — PROGRESS NOTES
Physical Therapy Daily Progress Note    [address]  [place] [address]      Patient: China Gomes   : 1960  Diagnosis/ICD-10 Code:  Acute pain of right shoulder [M25.511]  Referring practitioner: JOSE LUIS Shah  Date of Initial Visit: Type: THERAPY  Noted: 10/21/2022  Today's Date: 10/31/2022  Patient seen for 4 sessions           Patient reports: feeling looser and less pain in recent days. She slept on her R side last night for first time and did not awaken from pain, but awoke with pain.      Objective   See Exercise, Manual, and Modality Logs for complete treatment.       Assessment/Plan  Patient demonstrated improved cervical motor control and was able to perform a cervical retraction in seated position while unsupported for the first time. Added these to HEP as she was able to achieve symptom relief with this movement. Progressed scapular retraining to prone, during which she reported mild difficulty breathing once she became fatigued. She continues to require correction for scapular control with many exercises.             Timed:  Manual Therapy:    15     mins  87288;  Therapeutic Exercise:    25     mins  25548;     Neuromuscular Eugenia:   10    mins  19853;    Therapeutic Activity:     0     mins  67885;     Gait Trainin     mins  29060;     Ultrasound:     0     mins  82212;    Electrical Stimulation:    0     mins  56737 ( );    Untimed:  Electrical Stimulation:    0     mins  73578 ( );  Mechanical Traction:    0     mins  46715;     Timed Treatment:   50   mins   Total Treatment:     50   mins    Jostin Cruz PT  Physical Therapist

## 2022-11-03 ENCOUNTER — TREATMENT (OUTPATIENT)
Dept: PHYSICAL THERAPY | Facility: CLINIC | Age: 62
End: 2022-11-03

## 2022-11-03 DIAGNOSIS — M25.511 ACUTE PAIN OF RIGHT SHOULDER: Primary | ICD-10-CM

## 2022-11-03 PROCEDURE — 97110 THERAPEUTIC EXERCISES: CPT | Performed by: PHYSICAL THERAPIST

## 2022-11-03 PROCEDURE — 97112 NEUROMUSCULAR REEDUCATION: CPT | Performed by: PHYSICAL THERAPIST

## 2022-11-03 PROCEDURE — 97140 MANUAL THERAPY 1/> REGIONS: CPT | Performed by: PHYSICAL THERAPIST

## 2022-11-03 NOTE — PROGRESS NOTES
Physical Therapy Daily Progress Note    [address]  [place] [address]      Patient: China Gomes   : 1960  Diagnosis/ICD-10 Code:  Acute pain of right shoulder [M25.511]  Referring practitioner: JOSE LUIS Shah  Date of Initial Visit: Type: THERAPY  Noted: 10/21/2022  Today's Date: 11/3/2022  Patient seen for 5 sessions           Patient reports: her pain is no longer constant. It's no longer worse at night.      Objective   See Exercise, Manual, and Modality Logs for complete treatment.       Assessment/Plan  Progressed DNF retraining to include lift, which patient demonstrated with proper form but fatigue. Also progressed thoracic stabilization exercises with fatigue. Patient demonstrating improved tolerance to PA pressure to thoracic spine.            Timed:  Manual Therapy:    23     mins  10210;  Therapeutic Exercise:    17     mins  48872;     Neuromuscular Eugenia:   15    mins  20985;    Therapeutic Activity:     0     mins  84155;     Gait Trainin     mins  76674;     Ultrasound:     0     mins  11866;    Electrical Stimulation:    0     mins  83275 ( );    Untimed:  Electrical Stimulation:    0     mins  46573 ( );  Mechanical Traction:    0     mins  91169;     Timed Treatment:   55   mins   Total Treatment:     55   mins    Jostin Cruz PT  Physical Therapist

## 2022-11-07 ENCOUNTER — TREATMENT (OUTPATIENT)
Dept: PHYSICAL THERAPY | Facility: CLINIC | Age: 62
End: 2022-11-07

## 2022-11-07 DIAGNOSIS — M25.511 ACUTE PAIN OF RIGHT SHOULDER: Primary | ICD-10-CM

## 2022-11-07 PROCEDURE — 97140 MANUAL THERAPY 1/> REGIONS: CPT | Performed by: PHYSICAL THERAPIST

## 2022-11-07 PROCEDURE — 97110 THERAPEUTIC EXERCISES: CPT | Performed by: PHYSICAL THERAPIST

## 2022-11-07 PROCEDURE — 97112 NEUROMUSCULAR REEDUCATION: CPT | Performed by: PHYSICAL THERAPIST

## 2022-11-07 NOTE — PROGRESS NOTES
Physical Therapy Daily Progress Note    [address]  [place] [address]      Patient: China Gomes   : 1960  Diagnosis/ICD-10 Code:  Acute pain of right shoulder [M25.511]  Referring practitioner: JOSE LUIS Shah  Date of Initial Visit: Type: THERAPY  Noted: 10/21/2022  Today's Date: 2022  Patient seen for 6 sessions           Patient reports: having amigraine all weekend, which she attributes to fresh paint in her house, and not doing HEP. No headache currently.      Objective   See Exercise, Manual, and Modality Logs for complete treatment.       Assessment/Plan  Able to progress DNF and periscapular strengthening with fatigue but no increased pain. Less TTP today during STM though thoracic joints were more TTP.             Timed:  Manual Therapy:    15     mins  25109;  Therapeutic Exercise:    30     mins  58515;     Neuromuscular Eugenia:   10    mins  52690;    Therapeutic Activity:     0     mins  89626;     Gait Trainin     mins  49391;     Ultrasound:     0     mins  81338;    Electrical Stimulation:    0     mins  79605 ( );    Untimed:  Electrical Stimulation:    0     mins  43731 ( );  Mechanical Traction:    0     mins  02534;     Timed Treatment:   55   mins   Total Treatment:     55   mins    Jostin Cruz PT  Physical Therapist

## 2022-11-14 ENCOUNTER — TELEPHONE (OUTPATIENT)
Dept: PHYSICAL THERAPY | Facility: CLINIC | Age: 62
End: 2022-11-14

## 2022-11-15 ENCOUNTER — TREATMENT (OUTPATIENT)
Dept: PHYSICAL THERAPY | Facility: CLINIC | Age: 62
End: 2022-11-15

## 2022-11-15 DIAGNOSIS — M25.511 ACUTE PAIN OF RIGHT SHOULDER: Primary | ICD-10-CM

## 2022-11-15 PROCEDURE — 97530 THERAPEUTIC ACTIVITIES: CPT | Performed by: PHYSICAL THERAPIST

## 2022-11-15 PROCEDURE — 97110 THERAPEUTIC EXERCISES: CPT | Performed by: PHYSICAL THERAPIST

## 2022-11-15 PROCEDURE — 97140 MANUAL THERAPY 1/> REGIONS: CPT | Performed by: PHYSICAL THERAPIST

## 2022-11-15 PROCEDURE — 97112 NEUROMUSCULAR REEDUCATION: CPT | Performed by: PHYSICAL THERAPIST

## 2022-11-15 NOTE — PROGRESS NOTES
Physical Therapy Daily Progress Note    [address]  [place] [address]      Patient: China Gomes   : 1960  Diagnosis/ICD-10 Code:  Acute pain of right shoulder [M25.511]  Referring practitioner: JOSE LUIS Shah  Date of Initial Visit: Type: THERAPY  Noted: 10/21/2022  Today's Date: 11/15/2022  Patient seen for 7 sessions           Patient reports: her shoulder continues to improve. She now gets twinges of pain but it's no longer constant.      Objective   See Exercise, Manual, and Modality Logs for complete treatment.       Assessment/Plan  R shoulder AROM demonstrates improvement overall, especially after thoracic joint mobilization. She continues to struggle with scapular motor control, compensating with upper trap during shoulder movements in prone. Future visits will look to progress toward GH stabilization with addition of rotator cuff strengthening.            Timed:  Manual Therapy:    15     mins  69427;  Therapeutic Exercise:    10     mins  60745;     Neuromuscular Eugenia:   15    mins  25336;    Therapeutic Activity:     10     mins  64651;     Gait Trainin     mins  49730;     Ultrasound:     0     mins  35009;    Electrical Stimulation:    0     mins  07446 ( );    Untimed:  Electrical Stimulation:    0     mins  53936 ( );  Mechanical Traction:    0     mins  34766;     Timed Treatment:   50   mins   Total Treatment:     50   mins    Jostin Cruz PT  Physical Therapist

## 2022-11-17 ENCOUNTER — TREATMENT (OUTPATIENT)
Dept: PHYSICAL THERAPY | Facility: CLINIC | Age: 62
End: 2022-11-17

## 2022-11-17 DIAGNOSIS — M25.511 ACUTE PAIN OF RIGHT SHOULDER: Primary | ICD-10-CM

## 2022-11-17 PROCEDURE — 97110 THERAPEUTIC EXERCISES: CPT | Performed by: PHYSICAL THERAPIST

## 2022-11-17 PROCEDURE — 97530 THERAPEUTIC ACTIVITIES: CPT | Performed by: PHYSICAL THERAPIST

## 2022-11-17 PROCEDURE — 97112 NEUROMUSCULAR REEDUCATION: CPT | Performed by: PHYSICAL THERAPIST
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
## 2022-11-21 ENCOUNTER — TREATMENT (OUTPATIENT)
Dept: PHYSICAL THERAPY | Facility: CLINIC | Age: 62
End: 2022-11-21

## 2022-11-21 DIAGNOSIS — M25.511 ACUTE PAIN OF RIGHT SHOULDER: Primary | ICD-10-CM

## 2022-11-21 PROCEDURE — 97110 THERAPEUTIC EXERCISES: CPT | Performed by: PHYSICAL THERAPIST

## 2022-11-21 PROCEDURE — 97112 NEUROMUSCULAR REEDUCATION: CPT | Performed by: PHYSICAL THERAPIST

## 2022-11-21 NOTE — PROGRESS NOTES
Physical Therapy Daily Progress Note    [address] [facility], [address]  [place] [address]      Patient: China Gomes   : 1960  Diagnosis/ICD-10 Code:  Acute pain of right shoulder [M25.511]  Referring practitioner: JOSE LUIS Shah  Date of Initial Visit: Type: THERAPY  Noted: 10/21/2022  Today's Date: 2022  Patient seen for 9 sessions           Patient reports: her shoulder did well over the weekend, no longer experiencing constant pain.      Objective   See Exercise, Manual, and Modality Logs for complete treatment.       Assessment/Plan  Today's treatment focused on progressing periscapular strengthening, adding pushing exercises for anterior strength and stability. She reported fatigue on her R UE but not L. No pain reported except during trunk rotations, when was was horizontally abducting her R shoulder more without adequately rotating her spine. Upon correction, she reported symptom resolution. Upper quarter strengthening will continue to be progressed in upcoming visits.            Timed:  Manual Therapy:    0     mins  53878;  Therapeutic Exercise:    30     mins  25785;     Neuromuscular Eugenia:   10    mins  48578;    Therapeutic Activity:     0     mins  59540;     Gait Trainin     mins  11157;     Ultrasound:     0     mins  96397;    Electrical Stimulation:    0     mins  74054 ( );    Untimed:  Electrical Stimulation:    0     mins  74421 ( );  Mechanical Traction:    0     mins  47956;     Timed Treatment:   40   mins   Total Treatment:     40   mins    Jostin Cruz PT  Physical Therapist     English

## 2022-11-23 ENCOUNTER — TREATMENT (OUTPATIENT)
Dept: PHYSICAL THERAPY | Facility: CLINIC | Age: 62
End: 2022-11-23

## 2022-11-23 DIAGNOSIS — M25.511 ACUTE PAIN OF RIGHT SHOULDER: Primary | ICD-10-CM

## 2022-11-23 PROCEDURE — 97110 THERAPEUTIC EXERCISES: CPT | Performed by: PHYSICAL THERAPIST

## 2022-11-23 PROCEDURE — 97112 NEUROMUSCULAR REEDUCATION: CPT | Performed by: PHYSICAL THERAPIST

## 2022-11-23 NOTE — PROGRESS NOTES
Physical Therapy Daily Progress Note    [address] [facility]  [place] [address]      Patient: China Gomes   : 1960  Diagnosis/ICD-10 Code:  Acute pain of right shoulder [M25.511]  Referring practitioner: JOSE LUIS Shah  Date of Initial Visit: Type: THERAPY  Noted: 10/21/2022  Today's Date: 2022  Patient seen for 10 sessions           Patient reports: muscle soreness after last visit that still lingers, but no increased pain.      Objective   See Exercise, Manual, and Modality Logs for complete treatment.       Assessment/Plan  Initiated rotator cuff strengthening as planned. Progressing to this type of shoulder strengthening should begin to resolve her c/o shoulder pain reaching into her back seat and lifting overhead as she likely has functional GH instability from her R shoulder weakness.    With rotator cuff strengthening performed toward beginning of treatment, she fatigued more rapidly with her other exercises but reported no pain during or after treatment.        Timed:  Manual Therapy:    0     mins  76621;  Therapeutic Exercise:    30     mins  14834;     Neuromuscular Eugenia:   14    mins  21724;    Therapeutic Activity:     0     mins  35326;     Gait Trainin     mins  72680;     Ultrasound:     0     mins  77218;    Electrical Stimulation:    0     mins  95444 ( );    Untimed:  Electrical Stimulation:    0     mins  31125 ( );  Mechanical Traction:    0     mins  07720;     Timed Treatment:   44   mins   Total Treatment:     44   mins    Jostin Cruz PT  Physical Therapist

## 2022-11-29 ENCOUNTER — TREATMENT (OUTPATIENT)
Dept: PHYSICAL THERAPY | Facility: CLINIC | Age: 62
End: 2022-11-29

## 2022-11-29 DIAGNOSIS — M25.511 ACUTE PAIN OF RIGHT SHOULDER: Primary | ICD-10-CM

## 2022-11-29 PROCEDURE — 97110 THERAPEUTIC EXERCISES: CPT | Performed by: PHYSICAL THERAPIST

## 2022-11-29 PROCEDURE — 97112 NEUROMUSCULAR REEDUCATION: CPT | Performed by: PHYSICAL THERAPIST

## 2022-11-29 NOTE — PROGRESS NOTES
Physical Therapy Daily Progress Note    [address] [facility]  [place] [address]      Patient: China Gomes   : 1960  Diagnosis/ICD-10 Code:  Acute pain of right shoulder [M25.511]  Referring practitioner: JOSE LUIS Shah  Date of Initial Visit: Type: THERAPY  Noted: 10/21/2022  Today's Date: 2022  Patient seen for 11 sessions           Patient reports: no longer having the headache that caused her to cancel last visit.      Objective   See Exercise, Manual, and Modality Logs for complete treatment.       Assessment/Plan  Progressed resistances with multidirectional shoulder strengthening, with which she reported no pain but required increased rest breaks. Updated HEP to reflect more recent exercises and reduce time spent at home completing her routine. Though she required intermittent cueing for scapular position during medial scapular strengthening exercises, she was able to mostly self correct.          Timed:  Manual Therapy:    0     mins  73650;  Therapeutic Exercise:    38     mins  66100;     Neuromuscular Eugenia:   10    mins  99224;    Therapeutic Activity:     0     mins  42401;     Gait Trainin     mins  66129;     Ultrasound:     0     mins  37294;    Electrical Stimulation:    0     mins  72338 ( );    Untimed:  Electrical Stimulation:    0     mins  15935 ( );  Mechanical Traction:    0     mins  49438;     Timed Treatment:   48   mins   Total Treatment:     48   mins    Jostin Cruz PT  Physical Therapist

## 2022-12-01 ENCOUNTER — TREATMENT (OUTPATIENT)
Dept: PHYSICAL THERAPY | Facility: CLINIC | Age: 62
End: 2022-12-01

## 2022-12-01 DIAGNOSIS — M25.511 ACUTE PAIN OF RIGHT SHOULDER: Primary | ICD-10-CM

## 2022-12-01 PROCEDURE — 97112 NEUROMUSCULAR REEDUCATION: CPT | Performed by: PHYSICAL THERAPIST

## 2022-12-01 PROCEDURE — 97110 THERAPEUTIC EXERCISES: CPT | Performed by: PHYSICAL THERAPIST

## 2022-12-01 NOTE — PROGRESS NOTES
Physical Therapy Daily Progress Note    [address] [facility], [address]  [place] [address]      Patient: China Gomes   : 1960  Diagnosis/ICD-10 Code:  Acute pain of right shoulder [M25.511]  Referring practitioner: JOSE LUIS Shah  Date of Initial Visit: Type: THERAPY  Noted: 10/21/2022  Today's Date: 2022  Patient seen for 12 sessions           Patient reports: muscle soreness after last visit but it's resolved. She mostly notices weakness in her shoulder.      Objective   See Exercise, Manual, and Modality Logs for complete treatment.       Assessment/Plan  Continued with her current routine, progressing weight and reps where able. Moved shoulder ER strengthening in less stable position to supine with elbow supported, which patient tolerated with  movement patterns noted.            Timed:  Manual Therapy:    0     mins  01112;  Therapeutic Exercise:    34     mins  53016;     Neuromuscular Eugenia:   10    mins  65542;    Therapeutic Activity:     0     mins  30303;     Gait Trainin     mins  46164;     Ultrasound:     0     mins  21423;    Electrical Stimulation:    0     mins  83598 ( );    Untimed:  Electrical Stimulation:    0     mins  15371 ( );  Mechanical Traction:    0     mins  85665;     Timed Treatment:   44   mins   Total Treatment:     44   mins    Jostin Cruz PT  Physical Therapist

## 2022-12-06 ENCOUNTER — TREATMENT (OUTPATIENT)
Dept: PHYSICAL THERAPY | Facility: CLINIC | Age: 62
End: 2022-12-06

## 2022-12-06 DIAGNOSIS — M25.511 ACUTE PAIN OF RIGHT SHOULDER: Primary | ICD-10-CM

## 2022-12-06 PROCEDURE — 97112 NEUROMUSCULAR REEDUCATION: CPT | Performed by: PHYSICAL THERAPIST

## 2022-12-06 PROCEDURE — 97110 THERAPEUTIC EXERCISES: CPT | Performed by: PHYSICAL THERAPIST

## 2022-12-06 NOTE — PROGRESS NOTES
Physical Therapy Daily Progress Note    [address] [facility], [address]  [place] [address]      Patient: China Gomes   : 1960  Diagnosis/ICD-10 Code:  Acute pain of right shoulder [M25.511]  Referring practitioner: JOSE LUIS Shah  Date of Initial Visit: Type: THERAPY  Noted: 10/21/2022  Today's Date: 2022  Patient seen for 13 sessions           Patient reports: muscle soreness but no constant ache.      Objective   See Exercise, Manual, and Modality Logs for complete treatment.       Assessment/Plan  Continued with established routine but progressing resistances with many exercises. She was fatigued following today's visit but reported no pain during or after.            Timed:  Manual Therapy:    0     mins  42103;  Therapeutic Exercise:    30     mins  45078;     Neuromuscular Eugenia:   9    mins  08451;    Therapeutic Activity:     0     mins  62079;     Gait Trainin     mins  85149;     Ultrasound:     0     mins  99588;    Electrical Stimulation:    0     mins  59777 ( );    Untimed:  Electrical Stimulation:    0     mins  00596 ( );  Mechanical Traction:    0     mins  34532;     Timed Treatment:   39   mins   Total Treatment:     39   mins    Jostin Cruz PT  Physical Therapist

## 2022-12-08 ENCOUNTER — TREATMENT (OUTPATIENT)
Dept: PHYSICAL THERAPY | Facility: CLINIC | Age: 62
End: 2022-12-08

## 2022-12-08 DIAGNOSIS — M25.511 ACUTE PAIN OF RIGHT SHOULDER: Primary | ICD-10-CM

## 2022-12-08 PROCEDURE — 97140 MANUAL THERAPY 1/> REGIONS: CPT | Performed by: PHYSICAL THERAPIST

## 2022-12-08 PROCEDURE — 97112 NEUROMUSCULAR REEDUCATION: CPT | Performed by: PHYSICAL THERAPIST

## 2022-12-08 PROCEDURE — 97110 THERAPEUTIC EXERCISES: CPT | Performed by: PHYSICAL THERAPIST

## 2022-12-08 NOTE — PROGRESS NOTES
Physical Therapy Daily Progress Note    [address] [facility]  [place] [address]      Patient: China Gomes   : 1960  Diagnosis/ICD-10 Code:  Acute pain of right shoulder [M25.511]  Referring practitioner: JOSE LUIS Shah  Date of Initial Visit: Type: THERAPY  Noted: 10/21/2022  Today's Date: 2022  Patient seen for 14 sessions           Patient reports: being able to brush her hair using her R UE for the first time without pain this morning. She continues to awaken at night if she rolls onto her R side.      Objective   See Exercise, Manual, and Modality Logs for complete treatment.       Assessment/Plan   to thoracic spine remained TTP but improved with mobilization. Mobilization was resumed to try to alleviate positional symptoms in R sidelying at night. Continued with shoulder strength and periscapular retraining with good tolerance. Less shoulder stiffness reported after treatment.            Timed:  Manual Therapy:    8     mins  37605;  Therapeutic Exercise:    27     mins  03360;     Neuromuscular Eugenia:   15    mins  34598;    Therapeutic Activity:     0     mins  01512;     Gait Trainin     mins  57887;     Ultrasound:     0     mins  14884;    Electrical Stimulation:    0     mins  58195 ( );    Untimed:  Electrical Stimulation:    0     mins  75695 ( );  Mechanical Traction:    0     mins  01210;     Timed Treatment:   50   mins   Total Treatment:     50   mins    Jostin Cruz PT  Physical Therapist

## 2022-12-12 ENCOUNTER — TREATMENT (OUTPATIENT)
Dept: PHYSICAL THERAPY | Facility: CLINIC | Age: 62
End: 2022-12-12

## 2022-12-12 DIAGNOSIS — M25.511 ACUTE PAIN OF RIGHT SHOULDER: Primary | ICD-10-CM

## 2022-12-12 PROCEDURE — 97110 THERAPEUTIC EXERCISES: CPT | Performed by: PHYSICAL THERAPIST

## 2022-12-12 PROCEDURE — 97140 MANUAL THERAPY 1/> REGIONS: CPT | Performed by: PHYSICAL THERAPIST

## 2022-12-12 PROCEDURE — 97112 NEUROMUSCULAR REEDUCATION: CPT | Performed by: PHYSICAL THERAPIST

## 2022-12-12 NOTE — PROGRESS NOTES
Physical Therapy Daily Progress Note    [address]  [place] [address]      Patient: China Gomes   : 1960  Diagnosis/ICD-10 Code:  Acute pain of right shoulder [M25.511]  Referring practitioner: JOSE LUIS Shah  Date of Initial Visit: Type: THERAPY  Noted: 10/21/2022  Today's Date: 2022  Patient seen for 15 sessions           Patient reports improved tolerance for lying on her R side. She continues to experience pain with pushing through her UEs or moving her R UE to the side.      Objective   See Exercise, Manual, and Modality Logs for complete treatment.       Assessment/Plan  Continued thoracic mobilization R>L continued due to positive response from last visit. Added serratus strengthening due to c/o pain with pushing activities. She reported fatigue but no increased pain after treatment. Patient expressed interest in extended POC next visit to continue addressing positional tolerance and shoulder strength.            Timed:  Manual Therapy:    9     mins  59201;  Therapeutic Exercise:    27     mins  17276;     Neuromuscular Eugenia:   10    mins  34107;    Therapeutic Activity:     0     mins  69578;     Gait Trainin     mins  04134;     Ultrasound:     0     mins  77436;    Electrical Stimulation:    0     mins  60145 ( );    Untimed:  Electrical Stimulation:    0     mins  72551 ( );  Mechanical Traction:    0     mins  19995;     Timed Treatment:   46   mins   Total Treatment:     46   mins    Jostin Cruz PT  Physical Therapist

## 2022-12-13 ENCOUNTER — LAB (OUTPATIENT)
Dept: LAB | Facility: HOSPITAL | Age: 62
End: 2022-12-13

## 2022-12-13 ENCOUNTER — OFFICE VISIT (OUTPATIENT)
Dept: ENDOCRINOLOGY | Facility: CLINIC | Age: 62
End: 2022-12-13

## 2022-12-13 VITALS
OXYGEN SATURATION: 98 % | HEART RATE: 78 BPM | HEIGHT: 71 IN | SYSTOLIC BLOOD PRESSURE: 122 MMHG | WEIGHT: 181.1 LBS | BODY MASS INDEX: 25.35 KG/M2 | DIASTOLIC BLOOD PRESSURE: 74 MMHG

## 2022-12-13 DIAGNOSIS — E55.9 VITAMIN D DEFICIENCY: ICD-10-CM

## 2022-12-13 DIAGNOSIS — E03.9 ACQUIRED HYPOTHYROIDISM: Primary | ICD-10-CM

## 2022-12-13 LAB
25(OH)D3+25(OH)D2 SERPL-MCNC: 20.9 NG/ML (ref 30–100)
ALBUMIN SERPL-MCNC: 4.3 G/DL (ref 3.5–5.2)
ALBUMIN/GLOB SERPL: 2 G/DL
ALP SERPL-CCNC: 104 U/L (ref 39–117)
ALT SERPL-CCNC: 12 U/L (ref 1–33)
AST SERPL-CCNC: 18 U/L (ref 1–32)
BILIRUB SERPL-MCNC: 0.9 MG/DL (ref 0–1.2)
BUN SERPL-MCNC: 12 MG/DL (ref 8–23)
BUN/CREAT SERPL: 17.9 (ref 7–25)
CALCIUM SERPL-MCNC: 9.1 MG/DL (ref 8.6–10.5)
CHLORIDE SERPL-SCNC: 105 MMOL/L (ref 98–107)
CHOLEST SERPL-MCNC: 195 MG/DL (ref 0–200)
CO2 SERPL-SCNC: 26.2 MMOL/L (ref 22–29)
CREAT SERPL-MCNC: 0.67 MG/DL (ref 0.57–1)
EGFRCR SERPLBLD CKD-EPI 2021: 99.6 ML/MIN/1.73
GLOBULIN SER CALC-MCNC: 2.2 GM/DL
GLUCOSE SERPL-MCNC: 99 MG/DL (ref 65–99)
HDLC SERPL-MCNC: 48 MG/DL (ref 40–60)
LDLC SERPL CALC-MCNC: 114 MG/DL (ref 0–100)
POTASSIUM SERPL-SCNC: 4.4 MMOL/L (ref 3.5–5.2)
PROT SERPL-MCNC: 6.5 G/DL (ref 6–8.5)
SODIUM SERPL-SCNC: 139 MMOL/L (ref 136–145)
T4 FREE SERPL-MCNC: 1.51 NG/DL (ref 0.93–1.7)
TRIGL SERPL-MCNC: 187 MG/DL (ref 0–150)
TSH SERPL DL<=0.005 MIU/L-ACNC: 2.83 UIU/ML (ref 0.27–4.2)
VLDLC SERPL CALC-MCNC: 33 MG/DL (ref 5–40)

## 2022-12-13 PROCEDURE — 99214 OFFICE O/P EST MOD 30 MIN: CPT | Performed by: INTERNAL MEDICINE

## 2022-12-13 NOTE — PROGRESS NOTES
Hypothyroidism and Vitamin D Deficiency (Follow UP)    Subjective    China Gomes is a 62 y.o. female. she is here today for follow-up for evaluation of   Hypothyroidism, diagnosed in 2012.   Follow-up for hypothyroidism. Patient states she feels healthy overall. She also feels mildly fatigued.   On levothyroxine and the dose increased to 88 mcg and she felt much better - hair is growing back and energy level is better      Vit D deficiency - level was 5.8. In 2/2018 - she is taking high dose weekly supplement - often skips, so probably takes every other week. Last level in spring was 19.9       Review of Systems  Review of Systems   Constitutional: Negative for appetite change, unexpected weight gain and unexpected weight loss.   HENT: Negative.    Eyes: Negative.    Respiratory: Negative for cough and shortness of breath.    Cardiovascular: Negative for chest pain and palpitations.   Gastrointestinal: Negative for abdominal pain and constipation.   Genitourinary: Negative.    Musculoskeletal: Negative.    Skin: Negative for rash.   Neurological: Negative for numbness and headache.   Psychiatric/Behavioral: Negative for sleep disturbance and depressed mood. The patient is not nervous/anxious.      Current medications:  Current Outpatient Medications   Medication Sig Dispense Refill   • estradiol (ESTRACE) 1 MG tablet Take 1 mg by mouth Daily.     • famotidine (PEPCID) 20 MG tablet TAKE 1 TABLET BY MOUTH AT NIGHT AS NEEDED FOR HEARTBURN 90 tablet 0   • levothyroxine (SYNTHROID, LEVOTHROID) 88 MCG tablet Take 1 tablet by mouth Daily. 90 tablet 3   • Progesterone (PROMETRIUM) 200 MG capsule Take 200 mg by mouth Daily.     • vitamin D (ERGOCALCIFEROL) 1.25 MG (27820 UT) capsule capsule Take 1 capsule by mouth 1 (One) Time Per Week. 13 capsule 3     No current facility-administered medications for this visit.         Objective      Vitals:    12/13/22 0843   BP: 122/74   Pulse: 78   SpO2: 98%   Weight: 82.1 kg (181  "lb 1.6 oz)   Height: 180.3 cm (71\")   Body mass index is 25.26 kg/m².  Physical Exam   Constitutional: She is oriented to person, place, and time. She appears well-developed and well-nourished.   HENT:   Head: Normocephalic and atraumatic.   Cardiovascular: Normal rate, regular rhythm, normal heart sounds and normal pulses.   Pulmonary/Chest: Effort normal and breath sounds normal.   Musculoskeletal: No swelling.   Neurological: She is alert and oriented to person, place, and time.   Skin: Skin is warm and dry.   Psychiatric: Her behavior is normal. Judgment and thought content normal.       LABS AND IMAGING  No visits with results within 1 Month(s) from this visit.   Latest known visit with results is:   Office Visit on 04/26/2022   Component Date Value Ref Range Status   • TSH 04/26/2022 1.670  0.270 - 4.200 uIU/mL Final   • Free T4 04/26/2022 1.42  0.93 - 1.70 ng/dL Final    Results may be falsely increased if patient taking Biotin.   • 25 Hydroxy, Vitamin D 04/26/2022 19.9 (L)  30.0 - 100.0 ng/ml Final    Comment: Results may be falsely increased if patient taking Biotin.  Reference Range for Total Vitamin D 25(OH)  Deficiency <20.0 ng/mL  Insufficiency 21-29 ng/mL  Sufficiency  ng/mL  Toxicity >100 ng/ml         1. Acquired hypothyroidism    2. Vitamin D deficiency        Assessment & Plan      Problem List Items Addressed This Visit        Other    Hypothyroidism - Primary    Vitamin D deficiency         PLAN  Continue same dose levothyroxine 88 µg. Repeat labs      Cont  vitamin D 50 000 IU weekly. Repeat labs Last visit Vit D was low and I have encouraged weekly administration/     I have ordered a fasting labs this  Visit, send a copy of results to the      Follow-up in 8 -10 months.                  "

## 2022-12-20 ENCOUNTER — TREATMENT (OUTPATIENT)
Dept: PHYSICAL THERAPY | Facility: CLINIC | Age: 62
End: 2022-12-20

## 2022-12-20 DIAGNOSIS — M25.511 ACUTE PAIN OF RIGHT SHOULDER: Primary | ICD-10-CM

## 2022-12-20 PROCEDURE — 97140 MANUAL THERAPY 1/> REGIONS: CPT | Performed by: PHYSICAL THERAPIST

## 2022-12-20 PROCEDURE — 97110 THERAPEUTIC EXERCISES: CPT | Performed by: PHYSICAL THERAPIST

## 2022-12-20 PROCEDURE — 97164 PT RE-EVAL EST PLAN CARE: CPT | Performed by: PHYSICAL THERAPIST

## 2022-12-20 PROCEDURE — 97112 NEUROMUSCULAR REEDUCATION: CPT | Performed by: PHYSICAL THERAPIST
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
## 2022-12-22 ENCOUNTER — OFFICE VISIT (OUTPATIENT)
Dept: INTERNAL MEDICINE | Facility: CLINIC | Age: 62
End: 2022-12-22
Payer: COMMERCIAL

## 2022-12-22 VITALS
RESPIRATION RATE: 16 BRPM | OXYGEN SATURATION: 97 % | SYSTOLIC BLOOD PRESSURE: 130 MMHG | TEMPERATURE: 98.3 F | HEART RATE: 94 BPM | HEIGHT: 71 IN | DIASTOLIC BLOOD PRESSURE: 82 MMHG | BODY MASS INDEX: 25.06 KG/M2 | WEIGHT: 179 LBS
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
## 2022-12-22 DIAGNOSIS — M25.511 RIGHT SHOULDER PAIN, UNSPECIFIED CHRONICITY: ICD-10-CM

## 2022-12-22 PROCEDURE — 99213 OFFICE O/P EST LOW 20 MIN: CPT | Performed by: NURSE PRACTITIONER
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
## 2023-01-04 ENCOUNTER — TELEPHONE (OUTPATIENT)
Dept: PHYSICAL THERAPY | Facility: CLINIC | Age: 63
End: 2023-01-04

## 2023-01-10 ENCOUNTER — TREATMENT (OUTPATIENT)
Dept: PHYSICAL THERAPY | Facility: CLINIC | Age: 63
End: 2023-01-10
Payer: COMMERCIAL

## 2023-01-10 DIAGNOSIS — M25.511 ACUTE PAIN OF RIGHT SHOULDER: Primary | ICD-10-CM

## 2023-01-10 PROCEDURE — 97140 MANUAL THERAPY 1/> REGIONS: CPT | Performed by: PHYSICAL THERAPIST

## 2023-01-10 PROCEDURE — 97110 THERAPEUTIC EXERCISES: CPT | Performed by: PHYSICAL THERAPIST

## 2023-01-10 PROCEDURE — 97112 NEUROMUSCULAR REEDUCATION: CPT | Performed by: PHYSICAL THERAPIST

## 2023-01-10 NOTE — PROGRESS NOTES
Physical Therapy Daily Progress Note    [address] [facility], [address]  [place] [address]      Patient: China Gomes   : 1960  Diagnosis/ICD-10 Code:  Acute pain of right shoulder [M25.511]  Referring practitioner: JOSE LUIS Shah  Date of Initial Visit: Type: THERAPY  Noted: 10/21/2022  Today's Date: 1/10/2023  Patient seen for 17 sessions           Patient reports: continued pain when trying to sleep on her R side, which has worsened since being out of PT.      Objective   See Exercise, Manual, and Modality Logs for complete treatment.       Assessment/Plan  R costotransverse joints remain TTP and replicate her main symptoms. Less stiffness and improved R shoulder AROM reported after mobilizing these segments. Discussed possible treatment options if she eventually plateaus with PT.     Upcoming visits will likely focus on both thoracic mobility and shoulder girdle strength.        Timed:  Manual Therapy:    15     mins  95713;  Therapeutic Exercise:    30     mins  15854;     Neuromuscular Eugenia:   10    mins  50944;    Therapeutic Activity:     0     mins  46887;     Gait Trainin     mins  07008;     Ultrasound:     0     mins  02712;    Electrical Stimulation:    0     mins  36746 ( );    Untimed:  Electrical Stimulation:    0     mins  65060 ( );  Mechanical Traction:    0     mins  30507;     Timed Treatment:   55   mins   Total Treatment:     55   mins    Jostin Cruz PT  Physical Therapist

## 2023-01-13 ENCOUNTER — TREATMENT (OUTPATIENT)
Dept: PHYSICAL THERAPY | Facility: CLINIC | Age: 63
End: 2023-01-13
Payer: COMMERCIAL

## 2023-01-13 DIAGNOSIS — M25.511 ACUTE PAIN OF RIGHT SHOULDER: Primary | ICD-10-CM

## 2023-01-13 PROCEDURE — 97140 MANUAL THERAPY 1/> REGIONS: CPT | Performed by: PHYSICAL THERAPIST

## 2023-01-13 PROCEDURE — 97110 THERAPEUTIC EXERCISES: CPT | Performed by: PHYSICAL THERAPIST

## 2023-01-13 NOTE — PROGRESS NOTES
Physical Therapy Daily Progress Note    [address]  [place] [address]      Patient: China Gomes   : 1960  Diagnosis/ICD-10 Code:  Acute pain of right shoulder [M25.511]  Referring practitioner: JOSE LUIS Shah  Date of Initial Visit: Type: THERAPY  Noted: 10/21/2022  Today's Date: 2023  Patient seen for 18 sessions           Patient reports: feeling looser after receiving manual therapy and less pain after completing HEP.      Objective   See Exercise, Manual, and Modality Logs for complete treatment.       Assessment/Plan  Continues to respond well to thoracic mobility exercises. Patient expressed her frustration with lack of perceived improvement in baseline pain in recent weeks for the first time. She is open to discussing referral to pain management with her PCP, who she sees next week. Patient reminded of importance of continuing her thoracic mobility exercises on a consistent basis due to how joints receive nutrition and heal.            Timed:  Manual Therapy:    8     mins  39060;  Therapeutic Exercise:    27     mins  61106;     Neuromuscular Eugenia:   0    mins  83789;    Therapeutic Activity:     5     mins  07794;     Gait Trainin     mins  41616;     Ultrasound:     0     mins  81532;    Electrical Stimulation:    0     mins  42833 ( );    Untimed:  Electrical Stimulation:    0     mins  91384 ( );  Mechanical Traction:    0     mins  93326;     Timed Treatment:   40   mins   Total Treatment:     50   mins    Jostin Curz PT  Physical Therapist

## 2023-01-17 ENCOUNTER — TELEPHONE (OUTPATIENT)
Dept: INTERNAL MEDICINE | Facility: CLINIC | Age: 63
End: 2023-01-17
Payer: COMMERCIAL

## 2023-01-17 ENCOUNTER — TREATMENT (OUTPATIENT)
Dept: PHYSICAL THERAPY | Facility: CLINIC | Age: 63
End: 2023-01-17
Payer: COMMERCIAL

## 2023-01-17 DIAGNOSIS — M25.511 ACUTE PAIN OF RIGHT SHOULDER: Primary | ICD-10-CM

## 2023-01-17 DIAGNOSIS — M54.6 CHRONIC RIGHT-SIDED THORACIC BACK PAIN: Primary | ICD-10-CM

## 2023-01-17 DIAGNOSIS — G89.29 CHRONIC RIGHT-SIDED THORACIC BACK PAIN: Primary | ICD-10-CM

## 2023-01-17 PROCEDURE — 97140 MANUAL THERAPY 1/> REGIONS: CPT | Performed by: PHYSICAL THERAPIST

## 2023-01-17 PROCEDURE — 97110 THERAPEUTIC EXERCISES: CPT | Performed by: PHYSICAL THERAPIST
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
## 2023-01-17 NOTE — PROGRESS NOTES
Physical Therapy Daily Progress Note    [address] [facility]  [place] [address]      Patient: China Gomes   : 1960  Diagnosis/ICD-10 Code:  Acute pain of right shoulder [M25.511]  Referring practitioner: JOSE LUIS Shah  Date of Initial Visit: Type: THERAPY  Noted: 10/21/2022  Today's Date: 2023  Patient seen for 19 sessions           Patient reports: no change in symptoms since last visit but she discussed pain management with her daughter, who is a PA who has worked with local pain management clinics, and is open to pain management.      Objective   See Exercise, Manual, and Modality Logs for complete treatment.       Assessment/Plan  Patient continues to report decreased periscapular stiffness following R-costotransverse mobilization. Discussed patient's upcoming visit with her PCP, with patient expressing interest in receiving additional assessment for her back via imaging. Patient will be reassessed next visit to determine need for future care.            Timed:  Manual Therapy:    8     mins  59067;  Therapeutic Exercise:    25     mins  45063;     Neuromuscular Eugenia:   0    mins  72178;    Therapeutic Activity:     0     mins  44470;     Gait Trainin     mins  32252;     Ultrasound:     0     mins  97688;    Electrical Stimulation:    0     mins  69640 ( );    Untimed:  Electrical Stimulation:    0     mins  04564 ( );  Mechanical Traction:    0     mins  42951;     Timed Treatment:   33   mins   Total Treatment:     33   mins    Jostin Cruz PT  Physical Therapist

## 2023-01-19 ENCOUNTER — TREATMENT (OUTPATIENT)
Dept: PHYSICAL THERAPY | Facility: CLINIC | Age: 63
End: 2023-01-19
Payer: COMMERCIAL

## 2023-01-19 ENCOUNTER — OFFICE VISIT (OUTPATIENT)
Dept: INTERNAL MEDICINE | Facility: CLINIC | Age: 63
End: 2023-01-19
Payer: COMMERCIAL

## 2023-01-19 VITALS
SYSTOLIC BLOOD PRESSURE: 122 MMHG | RESPIRATION RATE: 16 BRPM | HEART RATE: 84 BPM | BODY MASS INDEX: 25.12 KG/M2 | DIASTOLIC BLOOD PRESSURE: 80 MMHG | WEIGHT: 180 LBS | TEMPERATURE: 98.2 F | OXYGEN SATURATION: 95 %

## 2023-01-19 DIAGNOSIS — M54.6 CHRONIC RIGHT-SIDED THORACIC BACK PAIN: ICD-10-CM
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
## 2023-01-19 DIAGNOSIS — G89.29 CHRONIC RIGHT-SIDED THORACIC BACK PAIN: ICD-10-CM

## 2023-01-19 DIAGNOSIS — M25.511 ACUTE PAIN OF RIGHT SHOULDER: Primary | ICD-10-CM

## 2023-01-19 PROCEDURE — 97140 MANUAL THERAPY 1/> REGIONS: CPT | Performed by: PHYSICAL THERAPIST

## 2023-01-19 PROCEDURE — 99213 OFFICE O/P EST LOW 20 MIN: CPT | Performed by: NURSE PRACTITIONER

## 2023-01-19 PROCEDURE — 97110 THERAPEUTIC EXERCISES: CPT | Performed by: PHYSICAL THERAPIST
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
## 2023-01-19 NOTE — PROGRESS NOTES
Physical Therapy Daily Progress Note    [address] [facility] [address]  [place] [address]      Patient: China Gomes   : 1960  Diagnosis/ICD-10 Code:  Acute pain of right shoulder [M25.511]  Referring practitioner: JOSE LUIS Shah  Date of Initial Visit: Type: THERAPY  Noted: 10/21/2022  Today's Date: 2023  Patient seen for 20 sessions           Patient reports: her BP has been mildly elevated and she is concerned about that.      Objective   See Exercise, Manual, and Modality Logs for complete treatment.       Assessment/Plan  Continued thoracic mobs, with improved pain-free R shoulder motion afterward. Resumed periscapular strength exercises with fatigue but no pain reported.            Timed:  Manual Therapy:    12     mins  53271;  Therapeutic Exercise:    31     mins  81744;     Neuromuscular Eugenia:   0    mins  06317;    Therapeutic Activity:     0     mins  47003;     Gait Trainin     mins  71598;     Ultrasound:     0     mins  00618;    Electrical Stimulation:    0     mins  07261 ( );    Untimed:  Electrical Stimulation:    0     mins  76608 ( );  Mechanical Traction:    0     mins  94910;     Timed Treatment:   43   mins   Total Treatment:     43   mins    Jostin Cruz PT  Physical Therapist

## 2023-01-24 ENCOUNTER — TREATMENT (OUTPATIENT)
Dept: PHYSICAL THERAPY | Facility: CLINIC | Age: 63
End: 2023-01-24
Payer: COMMERCIAL

## 2023-01-24 ENCOUNTER — HOSPITAL ENCOUNTER (OUTPATIENT)
Dept: GENERAL RADIOLOGY | Facility: HOSPITAL | Age: 63
Discharge: HOME | End: 2023-01-24
Admitting: NURSE PRACTITIONER
Payer: COMMERCIAL

## 2023-01-24 DIAGNOSIS — M25.511 ACUTE PAIN OF RIGHT SHOULDER: Primary | ICD-10-CM

## 2023-01-24 DIAGNOSIS — M54.6 CHRONIC RIGHT-SIDED THORACIC BACK PAIN: ICD-10-CM

## 2023-01-24 DIAGNOSIS — G89.29 CHRONIC RIGHT-SIDED THORACIC BACK PAIN: ICD-10-CM

## 2023-01-24 PROCEDURE — 72072 X-RAY EXAM THORAC SPINE 3VWS: CPT

## 2023-01-24 PROCEDURE — 97110 THERAPEUTIC EXERCISES: CPT | Performed by: PHYSICAL THERAPIST

## 2023-01-24 PROCEDURE — 97530 THERAPEUTIC ACTIVITIES: CPT | Performed by: PHYSICAL THERAPIST

## 2023-01-24 PROCEDURE — 97164 PT RE-EVAL EST PLAN CARE: CPT | Performed by: PHYSICAL THERAPIST
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
## 2023-01-26 ENCOUNTER — TELEPHONE (OUTPATIENT)
Dept: PHYSICAL THERAPY | Facility: CLINIC | Age: 63
End: 2023-01-26

## 2023-01-26 NOTE — TELEPHONE ENCOUNTER
Caller: China Gomes    Relationship: Self       What was the call regarding: OUT OF TOWN WITH MOTHER IN HOSPITAL

## 2023-01-31 ENCOUNTER — TREATMENT (OUTPATIENT)
Dept: PHYSICAL THERAPY | Facility: CLINIC | Age: 63
End: 2023-01-31
Payer: COMMERCIAL

## 2023-01-31 DIAGNOSIS — M25.511 ACUTE PAIN OF RIGHT SHOULDER: Primary | ICD-10-CM

## 2023-01-31 PROCEDURE — 97110 THERAPEUTIC EXERCISES: CPT | Performed by: PHYSICAL THERAPIST

## 2023-01-31 PROCEDURE — 97112 NEUROMUSCULAR REEDUCATION: CPT | Performed by: PHYSICAL THERAPIST

## 2023-01-31 PROCEDURE — 97140 MANUAL THERAPY 1/> REGIONS: CPT | Performed by: PHYSICAL THERAPIST

## 2023-01-31 NOTE — PROGRESS NOTES
Physical Therapy Daily Progress Note    [address] [facility] [address]  [place] [address]      Patient: China Gomes   : 1960  Diagnosis/ICD-10 Code:  Acute pain of right shoulder [M25.511]  Referring practitioner: JOSE LUIS Shah  Date of Initial Visit: Type: THERAPY  Noted: 10/21/2022  Today's Date: 2023  Patient seen for 22 sessions           Patient reports: having to drive about 5 hrs multiple times since last visit and continuing to manage her symptoms with HEP. She continued to wake up last night but the degree of pain was less. She recently had radiography completed of her thoracic region.      Objective   See Exercise, Manual, and Modality Logs for complete treatment.       Assessment/Plan  Most of patient's pain continues to be replicated with PA pressure to R T5-7 costotransverse joints, though they were less TTP than the past few weeks. Continued with strength/stability focus, progressing resistance as tolerated. Patient sees pain management tomorrow for assessment. Reviewed radiography with patient, including edu regarding anatomy and potential findings.            Timed:  Manual Therapy:    8     mins  38584;  Therapeutic Exercise:    15     mins  75866;     Neuromuscular Eugenia:   15    mins  79206;    Therapeutic Activity:     7     mins  65492;     Gait Trainin     mins  50017;     Ultrasound:     0     mins  49080;    Electrical Stimulation:    0     mins  26911 ( );    Untimed:  Electrical Stimulation:    0     mins  08117 ( );  Mechanical Traction:    0     mins  16829;     Timed Treatment:   45   mins   Total Treatment:     45   mins    Jostin Cruz PT  Physical Therapist

## 2023-02-01 ENCOUNTER — OFFICE VISIT (OUTPATIENT)
Dept: PAIN MEDICINE | Facility: CLINIC | Age: 63
End: 2023-02-01
Payer: COMMERCIAL

## 2023-02-01 ENCOUNTER — TELEPHONE (OUTPATIENT)
Dept: INTERNAL MEDICINE | Facility: CLINIC | Age: 63
End: 2023-02-01

## 2023-02-01 VITALS
HEART RATE: 75 BPM | TEMPERATURE: 96.9 F | HEIGHT: 71 IN | RESPIRATION RATE: 12 BRPM | SYSTOLIC BLOOD PRESSURE: 126 MMHG | DIASTOLIC BLOOD PRESSURE: 76 MMHG | BODY MASS INDEX: 25.45 KG/M2 | WEIGHT: 181.8 LBS | OXYGEN SATURATION: 97 %

## 2023-02-01 DIAGNOSIS — S29.012A STRAIN OF RHOMBOID MUSCLE, INITIAL ENCOUNTER: Primary | ICD-10-CM

## 2023-02-01 PROCEDURE — 99203 OFFICE O/P NEW LOW 30 MIN: CPT | Performed by: STUDENT IN AN ORGANIZED HEALTH CARE EDUCATION/TRAINING PROGRAM

## 2023-02-01 NOTE — TELEPHONE ENCOUNTER
Caller: China Gomes    Relationship: Self    Best call back number: [phone]    What is the best time to reach you: ANY TIME    Who are you requesting to speak with (clinical staff, provider,  specific staff member): JEFFRY WILSON    Do you know the name of the person who called: SELF    What was the call regarding: PATIENT WENT TO PAIN CLINIC THIS MORNING. PATIENT STATES PROVIDER WAS VERY DISMISSIVE. PATIENT DOES NOT LIKE PROVIDER AND WOULD LIKE TO BE REFERRED TO ANOTHER PROVIDER. PATIENT HAS BEEN IN PHYSICAL THERAPY SINCE October AND THIS PROVIDER WANTED HER TO DO ANOTHER 2 MONTHS BEFORE SHE COULD GET A STEROID SHOT. HE ALSO TOLD HER TO TAKE TUMERIC. PATIENT HAS NOT BEEN SLEEPING DUE TO HER PAIN. PATIENT ALSO SAID THAT THE OFFICE WAS FILTHY. PATIENT IS VERY UPSET.     Do you require a callback: YES
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
## 2023-02-03 ENCOUNTER — TREATMENT (OUTPATIENT)
Dept: PHYSICAL THERAPY | Facility: CLINIC | Age: 63
End: 2023-02-03
Payer: COMMERCIAL

## 2023-02-03 DIAGNOSIS — M25.511 ACUTE PAIN OF RIGHT SHOULDER: Primary | ICD-10-CM

## 2023-02-03 PROCEDURE — 97530 THERAPEUTIC ACTIVITIES: CPT | Performed by: PHYSICAL THERAPIST

## 2023-02-03 PROCEDURE — 97140 MANUAL THERAPY 1/> REGIONS: CPT | Performed by: PHYSICAL THERAPIST

## 2023-02-03 NOTE — PROGRESS NOTES
"Physical Therapy Daily Progress Note    [address] [facility], [address]  [place] [address]      Patient: China Gomes   : 1960  Diagnosis/ICD-10 Code:  No primary diagnosis found.  Referring practitioner: JOSE LUIS Shah  Date of Initial Visit: No linked episodes  Today's Date: 2/3/2023  Patient seen for Visit count could not be calculated. Make sure you are using a visit which is associated with an episode. sessions           QuickDASH: 26%    Patient reports: she may seek a second opinion from pain management after her recent visit. She took a valium last night, which allowed her to sleep through the night, and her pain is very low today.      Objective          Active Range of Motion     Right Shoulder   Flexion: WFL  Abduction: Right shoulder active abduction: \"twinge\" WFL  External rotation BTH: WFL  Internal rotation BTB: WFL    Strength/Myotome Testing     Right Shoulder     Planes of Motion   Flexion: 5   Extension: 5   Abduction: 5 (mild pain)   External rotation at 0°: 4+   Internal rotation at 0°: 5       See Exercise, Manual, and Modality Logs for complete treatment.     Goals  Short Term Goals (4 wks)  1. Patient will improve Quick Dash score to < 26 %. Met  2. Pt will demonstrate symmetrical, pain-free shoulder AROM. 95% met  3. Patient will be independent and compliant with initial home exercise program. Met   4. Patient will demonstrate R cervical AROM rotation >70 deg. Met    Long Term Goals (8 wks)  1. Patient to demonstrate normal and pain free UE strength with MMTs. 95% met  2. Patient will improve Quick Dash score to < 16 %. Not met  3. Patient will return to sleeping in any position. Not met  4. Pt. will be independent and compliant with advanced home exercise program to facilitate self-management of symptoms. Met  [facility] Met    Assessment/Plan  Discussed recent pain management visit, during which she expressed interest in " seeking second opinion. Also performed partial reassessment to ask for insurance auth. She demonstrates improved R shoulder AROM and strength, though they are not completely pain free. BADL function also continues to improve, but her main functional complaint of awakening 3-5x/night from pain remains unchanged.            Timed:  Manual Therapy:    12     mins  73073;  Therapeutic Exercise:    0     mins  70984;     Neuromuscular Eugenia:   0    mins  27012;    Therapeutic Activity:     38     mins  05253;     Gait Trainin     mins  39390;     Ultrasound:     0     mins  23492;    Electrical Stimulation:    0     mins  32422 ( );    Untimed:  Electrical Stimulation:    0     mins  30478 ( );  Mechanical Traction:    0     mins  11375;     Timed Treatment:   50   mins   Total Treatment:     50   mins    Jostin Cruz, PT  Physical Therapist

## 2023-02-07 ENCOUNTER — TELEPHONE (OUTPATIENT)
Dept: PHYSICAL THERAPY | Facility: CLINIC | Age: 63
End: 2023-02-07

## 2023-02-08 ENCOUNTER — TREATMENT (OUTPATIENT)
Dept: PHYSICAL THERAPY | Facility: CLINIC | Age: 63
End: 2023-02-08
Payer: COMMERCIAL

## 2023-02-08 DIAGNOSIS — M25.511 ACUTE PAIN OF RIGHT SHOULDER: Primary | ICD-10-CM

## 2023-02-08 PROCEDURE — 97112 NEUROMUSCULAR REEDUCATION: CPT | Performed by: PHYSICAL THERAPIST

## 2023-02-08 PROCEDURE — 97110 THERAPEUTIC EXERCISES: CPT | Performed by: PHYSICAL THERAPIST

## 2023-02-08 PROCEDURE — 97140 MANUAL THERAPY 1/> REGIONS: CPT | Performed by: PHYSICAL THERAPIST

## 2023-02-08 NOTE — PROGRESS NOTES
Physical Therapy Daily Progress Note    [address] [facility]  [place] [address]      Patient: China Gomes   : 1960  Diagnosis/ICD-10 Code:  Acute pain of right shoulder [M25.511]  Referring practitioner: JOSE LUIS Shah  Date of Initial Visit: Type: THERAPY  Noted: 10/21/2022  Today's Date: 2023  Patient seen for 23 sessions           Patient reports: not awakening from pain last night, which is the first time she has slept through the night since starting PT. No current pain noted.      Objective   See Exercise, Manual, and Modality Logs for complete treatment.       Assessment/Plan  Palpation of R thoracic paraspinals also replicated symptoms today, so trialed STM to this area as opposed to costotransverse mobs, though patient's response was not different (looser but mild increase in pain). May consider dry needling this region.            Timed:  Manual Therapy:    10     mins  94139;  Therapeutic Exercise:    25     mins  77215;     Neuromuscular Eugenia:   10    mins  50318;    Therapeutic Activity:     0     mins  06455;     Gait Trainin     mins  42423;     Ultrasound:     0     mins  84124;    Electrical Stimulation:    0     mins  84713 ( );    Untimed:  Electrical Stimulation:    0     mins  06920 ( );  Mechanical Traction:    0     mins  48259;     Timed Treatment:   45   mins   Total Treatment:     45   mins    Jostin Cruz PT  Physical Therapist

## 2023-02-09 DIAGNOSIS — G89.29 CHRONIC RIGHT-SIDED THORACIC BACK PAIN: Primary | ICD-10-CM

## 2023-02-09 DIAGNOSIS — M54.6 CHRONIC RIGHT-SIDED THORACIC BACK PAIN: Primary | ICD-10-CM

## 2023-02-14 ENCOUNTER — TREATMENT (OUTPATIENT)
Dept: PHYSICAL THERAPY | Facility: CLINIC | Age: 63
End: 2023-02-14
Payer: COMMERCIAL

## 2023-02-14 DIAGNOSIS — M25.511 ACUTE PAIN OF RIGHT SHOULDER: Primary | ICD-10-CM

## 2023-02-14 PROCEDURE — 97112 NEUROMUSCULAR REEDUCATION: CPT | Performed by: PHYSICAL THERAPIST

## 2023-02-14 PROCEDURE — 97140 MANUAL THERAPY 1/> REGIONS: CPT | Performed by: PHYSICAL THERAPIST

## 2023-02-14 PROCEDURE — 97110 THERAPEUTIC EXERCISES: CPT | Performed by: PHYSICAL THERAPIST

## 2023-02-14 NOTE — PROGRESS NOTES
Physical Therapy Daily Progress Note    [address] [facility], [address]  [place] [address]      Patient: China Gomes   : 1960  Diagnosis/ICD-10 Code:  Acute pain of right shoulder [M25.511]  Referring practitioner: JOSE LUIS Shah  Date of Initial Visit: Type: THERAPY  Noted: 10/21/2022  Today's Date: 2023  Patient seen for 24 sessions           Patient reports: having more good nights of sleep since last visit, though she still wakes up from pain during some nights.      Objective   See Exercise, Manual, and Modality Logs for complete treatment.       Assessment/Plan  With patient experiencing more nights with uninterrupted sleep, current routine was continued. Resumed rotation thoracic stabilization, with which patient struggled with R rotation and with B scapular stability. No increased pain reported during or after visit.            Timed:  Manual Therapy:    9     mins  28123;  Therapeutic Exercise:    15     mins  85848;     Neuromuscular Eugenia:   10    mins  90311;    Therapeutic Activity:     0     mins  23726;     Gait Trainin     mins  44867;     Ultrasound:     0     mins  60881;    Electrical Stimulation:    0     mins  93179 ( );    Untimed:  Electrical Stimulation:    0     mins  20480 ( );  Mechanical Traction:    0     mins  49862;     Timed Treatment:   34   mins   Total Treatment:     34   mins    Jostin Cruz PT  Physical Therapist

## 2023-02-17 ENCOUNTER — TREATMENT (OUTPATIENT)
Dept: PHYSICAL THERAPY | Facility: CLINIC | Age: 63
End: 2023-02-17
Payer: COMMERCIAL

## 2023-02-17 DIAGNOSIS — M25.511 ACUTE PAIN OF RIGHT SHOULDER: Primary | ICD-10-CM

## 2023-02-17 PROCEDURE — 97112 NEUROMUSCULAR REEDUCATION: CPT | Performed by: PHYSICAL THERAPIST

## 2023-02-17 PROCEDURE — 97140 MANUAL THERAPY 1/> REGIONS: CPT | Performed by: PHYSICAL THERAPIST

## 2023-02-17 PROCEDURE — 97110 THERAPEUTIC EXERCISES: CPT | Performed by: PHYSICAL THERAPIST

## 2023-02-17 NOTE — PROGRESS NOTES
Physical Therapy Daily Progress Note    [address] [facility], [address]  [place] [address]      Patient: China Gomes   : 1960  Diagnosis/ICD-10 Code:  Acute pain of right shoulder [M25.511]  Referring practitioner: JOSE LUIS Shah  Date of Initial Visit: Type: THERAPY  Noted: 10/21/2022  Today's Date: 2023  Patient seen for 25 sessions           Patient reports: sleeping very well until last night when her shoulder kept her from sleeping much at all.       Objective   See Exercise, Manual, and Modality Logs for complete treatment.       Assessment/Plan  Continued mobility interventions to R thoracic region. Advanced medial scapular strengthening without increased pain but fatigue.    Will reduce frequency to 1x/week for now as she awaits 2nd opinion with pain management.        Timed:  Manual Therapy:    14     mins  51687;  Therapeutic Exercise:    20     mins  90199;     Neuromuscular Eugenia:   10    mins  15681;    Therapeutic Activity:     0     mins  78837;     Gait Trainin     mins  17087;     Ultrasound:     0     mins  99825;    Electrical Stimulation:    0     mins  22796 ( );    Untimed:  Electrical Stimulation:    0     mins  71596 ( );  Mechanical Traction:    0     mins  46880;     Timed Treatment:   44   mins   Total Treatment:     44   mins    Jostin Cruz PT  Physical Therapist

## 2023-02-23 ENCOUNTER — TELEPHONE (OUTPATIENT)
Dept: PHYSICAL THERAPY | Facility: CLINIC | Age: 63
End: 2023-02-23

## 2023-03-01 ENCOUNTER — TELEPHONE (OUTPATIENT)
Dept: PHYSICAL THERAPY | Facility: CLINIC | Age: 63
End: 2023-03-01

## 2023-03-03 ENCOUNTER — TELEPHONE (OUTPATIENT)
Dept: PHYSICAL THERAPY | Facility: CLINIC | Age: 63
End: 2023-03-03

## 2023-03-06 ENCOUNTER — TREATMENT (OUTPATIENT)
Dept: PHYSICAL THERAPY | Facility: CLINIC | Age: 63
End: 2023-03-06
Payer: COMMERCIAL

## 2023-03-06 DIAGNOSIS — M25.511 ACUTE PAIN OF RIGHT SHOULDER: Primary | ICD-10-CM

## 2023-03-06 PROCEDURE — 97110 THERAPEUTIC EXERCISES: CPT | Performed by: PHYSICAL THERAPIST

## 2023-03-06 PROCEDURE — 97530 THERAPEUTIC ACTIVITIES: CPT | Performed by: PHYSICAL THERAPIST
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
## 2023-03-14 ENCOUNTER — TREATMENT (OUTPATIENT)
Dept: PHYSICAL THERAPY | Facility: CLINIC | Age: 63
End: 2023-03-14
Payer: COMMERCIAL

## 2023-03-14 DIAGNOSIS — M25.511 ACUTE PAIN OF RIGHT SHOULDER: Primary | ICD-10-CM

## 2023-03-14 PROCEDURE — 97140 MANUAL THERAPY 1/> REGIONS: CPT | Performed by: PHYSICAL THERAPIST

## 2023-03-14 PROCEDURE — 97110 THERAPEUTIC EXERCISES: CPT | Performed by: PHYSICAL THERAPIST

## 2023-03-14 PROCEDURE — DRYNDLTRIAL DRY NEEDLING TRIAL: Performed by: PHYSICAL THERAPIST

## 2023-03-14 NOTE — PROGRESS NOTES
Physical Therapy Daily Progress Note    [address] [facility], [address]  [place] [address]      Patient: China Gomes   : 1960  Diagnosis/ICD-10 Code:  Acute pain of right shoulder [M25.511]  Referring practitioner: JOSE LUIS Shah  Date of Initial Visit: Type: THERAPY  Noted: 10/21/2022  Today's Date: 3/14/2023  Patient seen for 27 sessions           Patient reports: she is scheduled to receive injections in almost 2 weeks. She is waking up less frequently over the past week. She reports less adherence to HEP.      Objective   See Exercise, Manual, and Modality Logs for complete treatment.       Assessment/Plan  Trialed dry needling to R mid thoracic region. She reported improved pain with thoracic mobilization and exercises today, but especially following dry needling. Exercises focused mainly on thoracic mobility today.            Timed:  Manual Therapy:    10     mins  22965;  Therapeutic Exercise:    26     mins  93887;     Neuromuscular Eugenia:   0    mins  31753;    Therapeutic Activity:     0     mins  32008;     Gait Trainin     mins  99254;     Ultrasound:     0     mins  66105;    Electrical Stimulation:    0     mins  72072 ( );    Untimed:  Electrical Stimulation:    0     mins  91295 ( );  Mechanical Traction:    0     mins  35264;     Timed Treatment:   36   mins   Total Treatment:     42   mins    Jostin Cruz PT  Physical Therapist

## 2023-03-14 NOTE — PROGRESS NOTES
Physical Therapy Daily Progress Note        Patient: China Gomes   : 1960  Diagnosis/ICD-10 Code:  Acute pain of right shoulder [M25.511]  Referring practitioner: JOSE LUIS Shah  Date of Initial Visit: Type: THERAPY  Noted: 10/21/2022  Today's Date: 3/14/2023  Patient seen for 27 sessions         Patient reports: pain along the mid thoracic region, it has improved but is still irritable when sleeping on her back in particular. Her pain level is 4/10 upon arrival.          Objective   See Exercise, Manual, and Modality Logs for complete treatment.       Assessment/Plan  Patient tolerates DN well. She reports mild improvements post treatment.  We will assess for improvement before determining appropriateness for additional sessions.     Continue DN on a self pay basis.              Timed:  Manual Therapy:    0     mins  56847;  Therapeutic Exercise:    0     mins  99626;     Neuromuscular Eugenia:    0    mins  24831;    Therapeutic Activity:     0     mins  69732;     Gait Trainin     mins  52300;     Ultrasound:     0     mins  74809;    Electrical Stimulation:    0     mins  55455 ( );    Untimed:  Electrical Stimulation:    0     mins  82783 ( );  Mechanical Traction:    0     mins  79967;     Timed Treatment:   0   mins   Total Treatment:     5   mins  SELF PAY CHYNA Anton License: [id number]

## 2023-03-21 ENCOUNTER — TREATMENT (OUTPATIENT)
Dept: PHYSICAL THERAPY | Facility: CLINIC | Age: 63
End: 2023-03-21
Payer: COMMERCIAL

## 2023-03-21 DIAGNOSIS — M25.511 ACUTE PAIN OF RIGHT SHOULDER: Primary | ICD-10-CM

## 2023-03-21 PROCEDURE — 97140 MANUAL THERAPY 1/> REGIONS: CPT | Performed by: PHYSICAL THERAPIST

## 2023-03-21 PROCEDURE — 97110 THERAPEUTIC EXERCISES: CPT | Performed by: PHYSICAL THERAPIST

## 2023-03-21 PROCEDURE — 97112 NEUROMUSCULAR REEDUCATION: CPT | Performed by: PHYSICAL THERAPIST

## 2023-03-21 NOTE — PROGRESS NOTES
Physical Therapy Daily Progress Note    [address] [facility]  [place] [address]      Patient: China Gomes   : 1960  Diagnosis/ICD-10 Code:  Acute pain of right shoulder [M25.511]  Referring practitioner: JOSE LUIS Shah  Date of Initial Visit: Type: THERAPY  Noted: 10/21/2022  Today's Date: 3/21/2023  Patient seen for 28 sessions           Patient reports: improved sleep for 2-3 days following dry needling last visit.      Objective   See Exercise, Manual, and Modality Logs for complete treatment.       Assessment/Plan  Held dry needling since patient will receive facet injections in 2 days. Continued thoracic mobility and shoulder strength focus, progressing weight when appropriate.            Timed:  Manual Therapy:    10     mins  57086;  Therapeutic Exercise:    23     mins  12825;     Neuromuscular Eugenia:   13    mins  22083;    Therapeutic Activity:     0     mins  52881;     Gait Trainin     mins  59104;     Ultrasound:     0     mins  21901;    Electrical Stimulation:    0     mins  58134 ( );    Untimed:  Electrical Stimulation:    0     mins  73280 ( );  Mechanical Traction:    0     mins  09792;     Timed Treatment:   46   mins   Total Treatment:     46   mins    Jostin Cruz PT  Physical Therapist

## 2023-03-30 ENCOUNTER — TREATMENT (OUTPATIENT)
Dept: PHYSICAL THERAPY | Facility: CLINIC | Age: 63
End: 2023-03-30
Payer: COMMERCIAL

## 2023-03-30 DIAGNOSIS — M25.511 ACUTE PAIN OF RIGHT SHOULDER: Primary | ICD-10-CM

## 2023-03-30 PROCEDURE — 97530 THERAPEUTIC ACTIVITIES: CPT | Performed by: PHYSICAL THERAPIST

## 2023-03-30 PROCEDURE — 97110 THERAPEUTIC EXERCISES: CPT | Performed by: PHYSICAL THERAPIST

## 2023-03-30 NOTE — PROGRESS NOTES
Physical Therapy Daily Progress Note and Discharge Summary      [address] [facility], [address]  [place] [address]    Patient: China Gomes   : 1960  Diagnosis/ICD-10 Code:  Acute pain of right shoulder [M25.511]  Referring practitioner: JOSE LUIS Shah  Date of Initial Visit: Type: THERAPY  Noted: 10/21/2022  Today's Date: 3/30/2023  Patient seen for 29 sessions      Subjective:     Subjective Questionnaire: QuickDASH: 11%    Subjective Evaluation    History of Present Illness  Mechanism of injury: CLOF: no pain removing shirts, sleeping on R side without awakening, carrying items using R UE    Subjective comment: Patient reports feeling significantly better since receiving injections in her back. She has slept for 4 straight nights without awakening from pain.  Patient Occupation:  and consultant Pain  Current pain ratin  At best pain ratin  At worst pain rating: 3         Objective          Palpation     Additional Palpation Details  Joint mobility: R thoracic pain with moderate PA pressure to multiple thoracic segments, hypomobile mid and upper thoracic segments    Active Range of Motion   Cervical/Thoracic Spine   Cervical    Flexion: WFL  Extension: WFL  Left lateral flexion: WFL  Right lateral flexion: WFL  Left rotation: WFL  Right rotation: WFL  Left Shoulder   Flexion: 160 degrees   Abduction: 170 degrees   External rotation BTH: T4   Internal rotation BTB: T6     Right Shoulder   Flexion: 160 degrees   Abduction: 172 degrees   External rotation BTH: T4   Internal rotation BTB: T7     Strength/Myotome Testing     Right Shoulder     Planes of Motion   Flexion: 5   Extension: 4+ (mild pain)   Abduction: 5   External rotation at 0°: 4+ (mild pain)   External rotation at 90°: 4+ (mild pain)   Internal rotation at 0°: 4+   Internal rotation at 90°: 4+         Goals  Short Term Goals (4 wks)  1. Patient will improve Quick Dash score to < 26 %. Met  2. Pt will demonstrate  symmetrical, pain-free shoulder AROM. Met  3. Patient will be independent and compliant with initial home exercise program. Met   4. Patient will demonstrate R cervical AROM rotation >70 deg. Met    Long Term Goals (8 wks)  1. Patient to demonstrate normal and pain free UE strength with MMTs. Mostly met  2. Patient will improve Quick Dash score to < 16 %. Met  3. Patient will return to sleeping in any position. Met  4. Pt. will be independent and compliant with advanced home exercise program to facilitate self-management of symptoms. Met  5. Patient to return to [facility] for bathing and dressing. Met    Discharge Summary  Patient reports 95% symptom improvement since receiving facet injections last week. She demonstrates normal shoulder and cervical AROM, with normal strength while reporting mild pain with resisted R shoulder extension and ER. Overall, she has reached maximum potential improvement with PT. She is independent with HEP focused on thoracic mobility and shoulder strengthening. She swung a golf club without pain following stretching. She is ready for discharge at this time.    Timed:  Manual Therapy:    0     mins  72131;  Therapeutic Exercise:    10     mins  49692;     Neuromuscular Eugenia:    0    mins  35596;    Therapeutic Activity:     28     mins  13068;     Gait Trainin     mins  07216;     Ultrasound:     0     mins  05684;    Electrical Stimulation:    0     mins  56280 ( );    Untimed:  Electrical Stimulation:    0     mins  66487 ( );  Mechanical Traction:    0     mins  91426;     Timed Treatment:   38   mins   Total Treatment:     38   mins      Jostin Cruz PT  Physical Therapist

## 2023-04-23 DIAGNOSIS — E03.9 ACQUIRED HYPOTHYROIDISM: ICD-10-CM

## 2023-04-24 NOTE — TELEPHONE ENCOUNTER
Rx Refill Note  Requested Prescriptions     Pending Prescriptions Disp Refills   • levothyroxine (SYNTHROID, LEVOTHROID) 88 MCG tablet [Pharmacy Med Name: LEVOTHYROXINE 0.088MG (88MCG) TAB] 90 tablet 3     Sig: TAKE 1 TABLET BY MOUTH DAILY      Last office visit with prescribing clinician: 12/13/2022     Next office visit with prescribing clinician: 10/18/2023

## 2023-04-25 RX ORDER — LEVOTHYROXINE SODIUM 88 UG/1
88 TABLET ORAL DAILY
Qty: 90 TABLET | Refills: 1 | Status: SHIPPED | OUTPATIENT
Start: 2023-04-25

## 2023-05-15 ENCOUNTER — TELEPHONE (OUTPATIENT)
Dept: INTERNAL MEDICINE | Facility: CLINIC | Age: 63
End: 2023-05-15
Payer: COMMERCIAL

## 2023-05-15 NOTE — TELEPHONE ENCOUNTER
Caller: China Gomes    Relationship: Self    Best call back number: [phone]    What medication are you requesting: SOMETHING FOR SYMPTOMS     What are your current symptoms: DIARRHEA     How long have you been experiencing symptoms: SINCE 5/14/23     Have you had these symptoms before:    [] Yes  [x] No    Have you been treated for these symptoms before:   [] Yes  [x] No    If a prescription is needed, what is your preferred pharmacy and phone number: [id number] - [facility] [address] AT [facility] [address] - [phone] [facility] [phone]      Additional notes: PATIENT WOULD LIKE TO KNOW IF SHE CAN BE PRESCRIBED A MEDICATION FOR THIS.
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
## 2023-05-17 ENCOUNTER — OFFICE VISIT (OUTPATIENT)
Dept: INTERNAL MEDICINE | Facility: CLINIC | Age: 63
End: 2023-05-17
Payer: COMMERCIAL

## 2023-05-17 VITALS
TEMPERATURE: 98.1 F | DIASTOLIC BLOOD PRESSURE: 70 MMHG | SYSTOLIC BLOOD PRESSURE: 110 MMHG | OXYGEN SATURATION: 99 % | HEIGHT: 71 IN | RESPIRATION RATE: 16 BRPM | BODY MASS INDEX: 24.36 KG/M2 | WEIGHT: 174 LBS | HEART RATE: 81 BPM

## 2023-05-17 DIAGNOSIS — R10.9 FLANK PAIN: ICD-10-CM

## 2023-05-17 DIAGNOSIS — R19.7 DIARRHEA, UNSPECIFIED TYPE: Primary | ICD-10-CM

## 2023-05-17 LAB
BILIRUB BLD-MCNC: NEGATIVE MG/DL
CLARITY, POC: CLEAR
COLOR UR: YELLOW
EXPIRATION DATE: ABNORMAL
GLUCOSE UR STRIP-MCNC: NEGATIVE MG/DL
KETONES UR QL: ABNORMAL
LEUKOCYTE EST, POC: ABNORMAL
Lab: ABNORMAL
NITRITE UR-MCNC: NEGATIVE MG/ML
PH UR: 6 [PH] (ref 5–8)
PROT UR STRIP-MCNC: ABNORMAL MG/DL
RBC # UR STRIP: ABNORMAL /UL
SP GR UR: 1.02 (ref 1–1.03)
UROBILINOGEN UR QL: NORMAL

## 2023-05-17 PROCEDURE — 87086 URINE CULTURE/COLONY COUNT: CPT | Performed by: NURSE PRACTITIONER

## 2023-05-17 PROCEDURE — 81003 URINALYSIS AUTO W/O SCOPE: CPT | Performed by: NURSE PRACTITIONER

## 2023-05-17 PROCEDURE — 99213 OFFICE O/P EST LOW 20 MIN: CPT | Performed by: NURSE PRACTITIONER

## 2023-05-17 NOTE — PROGRESS NOTES
Follow Up Office Visit      Date: 2023   Patient Name: China Gomes  : 1960   MRN: [id number]     Chief Complaint:    Chief Complaint   Patient presents with   • Diarrhea     Pt states had nausea and diarrhea x 4 days, still having some cramping       History of Present Illness: China Gomes is a 63 y.o. female who is here today for evaluation of acute GI sxs. She c/o diarrhea which first started 5d ago on . She describes explosive, watery diarrhea that is so severe that occasionally has episodes of incontinence. Stool color has been green to black to brown and clear. No blood or mucous. Accompanying recent sxs include epigastric cramping, nausea, and low back pain. Her last BM was yesterday at 1pm, she has consumed a bland diet of mostly just toast over the past few days. She endorses good intake of liquid. Has been treating symptoms with imodium and pepto bismol-- taking imodium 2-3 tablets daily over the past 5d. She reports 12 BMs/ day over the weekend and yesterday 6bms/ day. She denies recent ill exposure, travel, food, or abx. Family hx diverticulitis.       Subjective      Review of Systems:   Review of Systems   Constitutional: Negative for chills, fatigue and fever.   Respiratory: Negative.    Cardiovascular: Negative.    Gastrointestinal: Positive for abdominal pain (epigastric ), diarrhea and nausea. Negative for abdominal distention, anal bleeding, blood in stool, rectal pain and vomiting.   Genitourinary: Negative for hematuria.   Musculoskeletal: Positive for back pain.   Neurological: Positive for dizziness. Negative for weakness.       I have reviewed the patients family history, social history, past medical history, past surgical history and have updated it as appropriate.     Medications:     Current Outpatient Medications:   •  estradiol (ESTRACE) 1 MG tablet, Take 1 tablet by mouth Daily., Disp: , Rfl:   •  famotidine (PEPCID) 20 MG tablet, TAKE 1 TABLET BY MOUTH  "AT NIGHT AS NEEDED FOR HEARTBURN, Disp: 90 tablet, Rfl: 0  •  levothyroxine (SYNTHROID, LEVOTHROID) 88 MCG tablet, TAKE 1 TABLET BY MOUTH DAILY, Disp: 90 tablet, Rfl: 1  •  Progesterone (PROMETRIUM) 200 MG capsule, Take 1 capsule by mouth Daily., Disp: , Rfl:   •  vitamin D (ERGOCALCIFEROL) 1.25 MG (66217 UT) capsule capsule, Take 1 capsule by mouth 1 (One) Time Per Week., Disp: 13 capsule, Rfl: 3    Allergies:   Allergies   Allergen Reactions   • Meperidine Itching and Rash       Objective     Physical Exam: Please see above  Vital Signs:   Vitals:    05/17/23 0855   BP: 110/70   Pulse: 81   Resp: 16   Temp: 98.1 °F (36.7 °C)   SpO2: 99%   Weight: 78.9 kg (174 lb)   Height: 180.3 cm (71\")   PainSc: 0-No pain     Body mass index is 24.27 kg/m².    Physical Exam  Vitals and nursing note reviewed.   Constitutional:       General: She is not in acute distress.     Appearance: Normal appearance. She is normal weight. She is not ill-appearing.   HENT:      Head: Normocephalic and atraumatic.      Mouth/Throat:      Mouth: Mucous membranes are moist.      Pharynx: Oropharynx is clear. No posterior oropharyngeal erythema.   Cardiovascular:      Rate and Rhythm: Normal rate and regular rhythm.      Pulses: Normal pulses.      Heart sounds: Normal heart sounds.   Pulmonary:      Effort: Pulmonary effort is normal. No respiratory distress.      Breath sounds: Normal breath sounds.   Abdominal:      General: Abdomen is flat. Bowel sounds are increased. There is no distension.      Palpations: Abdomen is soft. There is no hepatomegaly or splenomegaly.      Tenderness: There is no abdominal tenderness.   Musculoskeletal:      Right lower leg: No edema.      Left lower leg: No edema.   Skin:     General: Skin is warm and dry.      Comments: Skin turgor WNL   Neurological:      Mental Status: She is alert and oriented to person, place, and time.      Motor: No weakness.   Psychiatric:         Mood and Affect: Mood normal.         " Behavior: Behavior normal.         Thought Content: Thought content normal.         Judgment: Judgment normal.             Results:   Imaging:     Labs:    Latest Reference Range & Units 05/17/23 09:25   Color, UA Yellow, Straw, Dark Yellow,   Yellow   Appearance, UA Clear  Clear   Specific Gravity, UA 1.005 - 1.030  1.025   pH, UA 5.0 - 8.0  6.0   Glucose Negative mg/dL Negative   Ketones, UA Negative  1+ !   Blood, UA Negative  3+ !   Nitrite, UA Negative  Negative   Leukocytes, UA Negative  Large (3+) !   Protein, UA Negative mg/dL 1+ !   Bilirubin, UA Negative  Negative   Urobilinogen, UA Normal, 0.2 E.U./dL  Normal   Expiration Date  12/24/2024   Lot Number  [id number]   !: Data is abnormal    Assessment / Plan      Assessment/Plan:   Diagnoses and all orders for this visit:    1. Diarrhea, unspecified type (Primary)    2. Flank pain  -     Cancel: POC Urinalysis Dipstick  -     POCT urinalysis dipstick, automated    -Symptoms persistent, but overall improved since onset. I discussed concern regarding the frequency and amount of loose stools she is having-- which is indication for diagnostic testing. She would like to hold off for now but will touch base me before the weekend if symptoms persist or before then if anything changes/ worsens. Instructed to push fluid intake and described s/s of dehydration to monitor for. We will check a ua to r/o infection in light of flank pain and recent stool incontinence.        Follow Up:   Return if symptoms worsen or fail to improve, for Next scheduled follow up.    JOSE LUIS Saeed  [facility] [place]

## 2023-05-19 ENCOUNTER — TELEPHONE (OUTPATIENT)
Dept: INTERNAL MEDICINE | Facility: CLINIC | Age: 63
End: 2023-05-19
Payer: COMMERCIAL

## 2023-05-19 NOTE — TELEPHONE ENCOUNTER
Caller: China Gomes    Relationship: Self    Best call back number: [phone]    What is the best time to reach you: ANYTIME    Who are you requesting to speak with (clinical staff, provider,  specific staff member): PCP/MA        What was the call regarding: PATIENT STATED SHE IS FEELING BETTER AND IS IN NO HURRY BUT WANTS TO SEE ABOUT GETTING A CAT SCAN TO SEE IF SHE MAY HAVE DIVERTICULITIS PER PCP. PATIENT WANTED TO KNOW IF THERE HAS TO BE A ACTIVE EPISODE TO SHOW UP ON A CAT SCAN. PATIENT ALSO WANTS THE RESULTS ON HER URINE TEST TO SEE IF SHE HAS A UTI BECAUSE SHE DOES STILL HAVE SOME BACK PAIN. CALL PATIENT TO DISCUSS/ADVISE    Do you require a callback: YES

## 2023-05-20 LAB
BACTERIA UR CULT: NORMAL
BACTERIA UR CULT: NORMAL

## 2023-05-21 DIAGNOSIS — N30.01 ACUTE CYSTITIS WITH HEMATURIA: Primary | ICD-10-CM

## 2023-05-21 RX ORDER — NITROFURANTOIN 25; 75 MG/1; MG/1
100 CAPSULE ORAL 2 TIMES DAILY
Qty: 10 CAPSULE | Refills: 0 | Status: SHIPPED | OUTPATIENT
Start: 2023-05-21 | End: 2023-05-26

## 2023-05-22 NOTE — TELEPHONE ENCOUNTER
Called pt and we discussed the results and recommendations as per Stephanie. Pt verbalized her understanding.

## 2023-06-01 NOTE — TELEPHONE ENCOUNTER
Rx Refill Note  Requested Prescriptions     Pending Prescriptions Disp Refills   • vitamin D (ERGOCALCIFEROL) 1.25 MG (85792 UT) capsule capsule [Pharmacy Med Name: VITAMIN D2 50,000IU (ERGO) CAP RX] 13 capsule 3     Sig: TAKE 1 CAPSULE BY MOUTH 1 TIME EVERY WEEK          Last office visit with prescribing clinician: 12/13/2022     Next office visit with prescribing clinician: 10/18/2023         Cecily Velasco MA  06/01/23, 16:16 EDT

## 2023-06-02 RX ORDER — ERGOCALCIFEROL 1.25 MG/1
CAPSULE ORAL
Qty: 13 CAPSULE | Refills: 3 | Status: SHIPPED | OUTPATIENT
Start: 2023-06-02

## 2023-08-10 ENCOUNTER — OFFICE VISIT (OUTPATIENT)
Dept: INTERNAL MEDICINE | Facility: CLINIC | Age: 63
End: 2023-08-10
Payer: COMMERCIAL

## 2023-08-10 ENCOUNTER — LAB (OUTPATIENT)
Dept: LAB | Facility: HOSPITAL | Age: 63
End: 2023-08-10
Payer: COMMERCIAL

## 2023-08-10 VITALS
BODY MASS INDEX: 25.2 KG/M2 | HEART RATE: 82 BPM | SYSTOLIC BLOOD PRESSURE: 138 MMHG | OXYGEN SATURATION: 96 % | TEMPERATURE: 98 F | WEIGHT: 180 LBS | RESPIRATION RATE: 16 BRPM | HEIGHT: 71 IN | DIASTOLIC BLOOD PRESSURE: 84 MMHG

## 2023-08-10 DIAGNOSIS — E66.3 OVERWEIGHT WITH BODY MASS INDEX (BMI) 25.0-29.9: ICD-10-CM

## 2023-08-10 DIAGNOSIS — E55.9 VITAMIN D DEFICIENCY: ICD-10-CM

## 2023-08-10 DIAGNOSIS — Z00.00 ANNUAL PHYSICAL EXAM: ICD-10-CM

## 2023-08-10 DIAGNOSIS — Z11.59 NEED FOR HEPATITIS C SCREENING TEST: ICD-10-CM

## 2023-08-10 DIAGNOSIS — Z00.00 ANNUAL PHYSICAL EXAM: Primary | ICD-10-CM

## 2023-08-10 DIAGNOSIS — R03.0 ELEVATED BLOOD PRESSURE READING IN OFFICE WITHOUT DIAGNOSIS OF HYPERTENSION: ICD-10-CM

## 2023-08-10 DIAGNOSIS — E03.9 ACQUIRED HYPOTHYROIDISM: ICD-10-CM

## 2023-08-10 DIAGNOSIS — R92.8 ABNORMAL FINDING ON BREAST IMAGING: ICD-10-CM

## 2023-08-10 DIAGNOSIS — H91.93 BILATERAL HEARING LOSS, UNSPECIFIED HEARING LOSS TYPE: ICD-10-CM

## 2023-08-10 NOTE — PROGRESS NOTES
Female Physical Note      Date: 08/10/2023   Patient Name: China Gomes  : 1960   MRN: [id number]     Chief Complaint   Patient presents with    Annual Exam         History of Present Illness:  China Gomes is a 63 y.o. female who is here today for her annual health maintenance exam and physical. A gynecologic evaluation will also be performed. / Annual breast and pelvic exams are performed by OBGYN Dr Perera.  The last health maintenance visit was 1 year(s) ago.    Care Team:  Patient Care Team:  Twila Brown APRN as PCP - General (Family Medicine)  Billie Meehan MD as Consulting Physician (Endocrinology)  Deja Perera MD as Consulting Physician (Obstetrics and Gynecology)  Dr Keenan- Allie mgmt     She has no acute complaints or concerns today.     General Health: She describes her health as good.  She has been doing well over the last year with no falls, hospitalizations, or ER visits. She has had no surgeries or changes in medical history over the last year.  There have also been no changes in family history.  She is due for eye and dental exams. She denies changes in vision and hearing. Reports needing new hearing aids. She describes her mood as good. She is not up-to-date on immunizations.     Any notable personal or family history of cancer or CV disease? Mother and grandparents.     Lifestyle: Household members include spouse. She is  with 4 biological children and 6 stepchildren. Work status: She is working full time- currently 2 jobs. Occupation is professor. She eats a diverse and healthy diet and walks every other day for exercise. She does not have any weight concerns. She does not use tobacco products, vape/ e-cigarettes, alcohol, or illicit drugs. Caffeine use is 1 cup of tea in the mornings. She reports wearing seat belts and avoidance of texting while driving. She wears sunscreen while outdoors on most occasions. She endorses working smoke detectors in  the home.     Pertinent chronic medical conditions are as follows:    Back pain  Patient had an ablation on her back with Dr. Keenan.   She also had injections.   Reports being pain free for 2 months.   Notes being able to play golf after 1 year.   She no longer follows Dr. Fox.     Maintenance  Patient follows endocrinologist, Dr. Meehan.   Her most recent mammogram was done at [facility].   Pap smear was done around 07/05/2023.   Colonoscopy was done in 2016 with Dr. Monsivais.   Due for the shingles vaccine.   Declines the COVID-19 vaccine.     History  Her mother has a history of COPD.   Her youngest son has an insulin deficiency.   Patient's daughter has a pituitary gland tumor.  Patient had gestational diabetes with her youngest child.     Decreased hearing  Patient reports that she needs to get new hearing aids.   She has a tube in her left ear and had tympanoplasty on her right ear.     Postmenopausal  Follows OBGYN, Dr. Deja Perera, and her last appointment was 1 month ago.   Patient has not had a menstrual cycle in many years, but she spots occasionally.   Dr. Perera decreased her hormones 1 week ago. Pt is not happy with this decision    Dizziness  Experiences dizziness when she waits too long to eat.     Subjective       Past Medical History:   Diagnosis Date    GERD (gastroesophageal reflux disease)     Hashimoto's disease 2005    Hypothyroidism      Past Surgical History:   Procedure Laterality Date    CHOLECYSTECTOMY      CYST REMOVAL Left     HX OVARIAN CYSTECTOMY Bilateral     OTHER SURGICAL HISTORY      ear pressure equalization tube, insertion, bilaterally    TYMPANOPLASTY       Family History   Problem Relation Age of Onset    Arthritis Mother     Hypertension Mother     Thyroid disease Mother     Thyroid disease Daughter     Breast cancer Neg Hx     Ovarian cancer Neg Hx        Current Outpatient Medications:     estradiol (ESTRACE) 1 MG tablet, Take 1 tablet by mouth Daily.,  Disp: , Rfl:     famotidine (PEPCID) 20 MG tablet, TAKE 1 TABLET BY MOUTH AT NIGHT AS NEEDED FOR HEARTBURN, Disp: 90 tablet, Rfl: 0    levothyroxine (SYNTHROID, LEVOTHROID) 88 MCG tablet, TAKE 1 TABLET BY MOUTH DAILY, Disp: 90 tablet, Rfl: 1    Progesterone (PROMETRIUM) 200 MG capsule, Take 1 capsule by mouth Daily., Disp: , Rfl:     vitamin D (ERGOCALCIFEROL) 1.25 MG (60276 UT) capsule capsule, TAKE 1 CAPSULE BY MOUTH 1 TIME EVERY WEEK, Disp: 13 capsule, Rfl: 3  Allergies   Allergen Reactions    Meperidine Itching and Rash     Immunization History   Administered Date(s) Administered    Hepatitis A 12/04/2018, 06/06/2019    Tdap 06/08/2016     Health Maintenance Summary            Overdue - MAMMOGRAM (Every 2 Years) Overdue since 2/18/2021 02/18/2019  SCANNED - MAMMO              Postponed - ZOSTER VACCINE (1 of 2) Postponed until 9/15/2023      No completion history exists for this topic.              Postponed - COVID-19 Vaccine (1) Postponed until 8/29/2025      No completion history exists for this topic.              INFLUENZA VACCINE (Yearly - October to March) Next due on 10/1/2023      01/03/2019  Declined              ANNUAL PHYSICAL (Yearly) Next due on 8/10/2024      08/10/2023  Done              TDAP/TD VACCINES (2 - Td or Tdap) Next due on 6/8/2026 06/08/2016  Imm Admin: Tdap              PAP SMEAR (Every 3 Years) Next due on 7/19/2026 07/19/2023  Done    11/23/2018  Done              COLORECTAL CANCER SCREENING (COLONOSCOPY - Every 10 Years) Next due on 10/27/2026      10/27/2016  COLONOSCOPY (Done)              HEPATITIS C SCREENING  Ordered on 8/10/2023      08/10/2023  Hep C Virus Ab component of Hepatitis C Antibody              Pneumococcal Vaccine 0-64 (Series Information) Aged Out      No completion history exists for this topic.                      PHQ-2/PHQ-9 Depression Screening:  PHQ Total Score: 0        Intimate partner violence: (Screen on initial visit, pregnant  "women, women with injuries, older adult with injury or evidence of neglect):  Violence can be a problem in many people's lives, so I now ask every patient about trauma or abuse they may have experienced in a relationship.  Stress/Safety - Do you feel safe in your relationship?  Afraid/Abused - Have you ever been in a relationship where you were threatened, hurt, or afraid?  Friend/Family - Are your friends aware you have been hurt?  Emergency Plan - Do you have a safe place to go and the resources you need in an emergency?    Osteoporosis:   Ost menopausal women < 65 with RF (advancing age, previous fracture, glucocorticoid therapy, parental hip fracture, low body weight, current cigarette smoking, excessive alcohol consumption, rheumatoid arthritis, secondary osteoporosis [hypogonadism/premature menopause, malabsorption, chronic liver disease, IBD]).  All women 65 or older    Objective     Vitals:    08/10/23 1109   BP: 138/84   Pulse: 82   Resp: 16   Temp: 98 øF (36.7 øC)   SpO2: 96%   Weight: 81.6 kg (180 lb)   Height: 180.3 cm (71\")   PainSc: 0-No pain     Body mass index is 25.1 kg/mý.     Physical Exam  Vitals and nursing note reviewed.   Constitutional:       General: She is awake. She is not in acute distress.     Appearance: Normal appearance. She is well-developed and normal weight. She is not ill-appearing or diaphoretic.   HENT:      Head: Normocephalic and atraumatic.      Right Ear: Tympanic membrane, ear canal and external ear normal.      Left Ear: External ear normal.      Ears:      Comments: Left tube noted      Nose: Nose normal.      Mouth/Throat:      Mouth: Mucous membranes are moist.      Pharynx: Oropharynx is clear. No oropharyngeal exudate or posterior oropharyngeal erythema.   Eyes:      Extraocular Movements: Extraocular movements intact.      Conjunctiva/sclera: Conjunctivae normal.      Pupils: Pupils are equal, round, and reactive to light.   Neck:      Thyroid: No thyroid mass, " thyromegaly or thyroid tenderness.      Vascular: No carotid bruit or JVD.   Cardiovascular:      Rate and Rhythm: Normal rate and regular rhythm.      Pulses: Normal pulses.      Heart sounds: Normal heart sounds.     No S3 or S4 sounds.   Pulmonary:      Effort: Pulmonary effort is normal. No respiratory distress.      Breath sounds: Normal breath sounds and air entry.   Abdominal:      General: Abdomen is flat. Bowel sounds are normal. There is no distension.      Palpations: Abdomen is soft. There is no hepatomegaly or splenomegaly.      Tenderness: There is no abdominal tenderness. There is no guarding or rebound.   Musculoskeletal:         General: No swelling.      Cervical back: Normal range of motion and neck supple.      Right lower leg: No edema.      Left lower leg: No edema.   Lymphadenopathy:      Cervical: No cervical adenopathy.   Skin:     General: Skin is warm and dry.      Capillary Refill: Capillary refill takes less than 2 seconds.   Neurological:      General: No focal deficit present.      Mental Status: She is alert and oriented to person, place, and time. Mental status is at baseline.      Cranial Nerves: No cranial nerve deficit.      Sensory: No sensory deficit.      Motor: No weakness.      Coordination: Coordination normal.      Gait: Gait normal.      Deep Tendon Reflexes: Reflexes normal.   Psychiatric:         Attention and Perception: Attention and perception normal.         Mood and Affect: Mood and affect normal.         Speech: Speech normal.         Behavior: Behavior normal.         Thought Content: Thought content normal.         Judgment: Judgment normal.               Assessment / Plan      Assessment/Plan:   Diagnoses and all orders for this visit:    1. Annual physical exam (Primary)  -Advice and education given regarding routine dental evaluations, routine eye exams, reproductive health, cardiovascular risk reduction, sunscreen use, and seatbelt use (generall overall  safety). Further recommendations after lab evaluation. Annual wellness evaluations recommended.    -     CBC (No Diff); Future  -     Comprehensive Metabolic Panel; Future  -     Hemoglobin A1c; Future  -     Lipid Panel; Future    2. Overweight with body mass index (BMI) 25.0-29.9  -     Hemoglobin A1c; Future    3. Acquired hypothyroidism  -controlled on current tx  -f/u with endo as scheduled     4. Vitamin D deficiency  -continue supplementation with adequate dietary calcium (1200mg qd)    5. Abnormal finding on breast imaging   -Last bilateral mmg was completed in 2/2019 at [facility]. Radiology report reviewed and discussed with maryann Harris 4. Discussed overdue breast imaging and recommendation for annual mmg as secondary screening breast cancer.     6. Elevated blood pressure reading in office without diagnosis of hypertension  -Continue to monitor BP. Check once daily and again if symptomatic. Notify me if consistently <130/80    7. Need for hepatitis C screening test  -     Hepatitis C Antibody; Future    8. Bilateral hearing loss, unspecified hearing loss type  -hearing aids in place          Follow Up:   Return in about 1 year (around 8/10/2024) for Annual.    I spent approximately 45 minutes providing clinical care for this patient; including review of patient's chart and provider documentation, face to face time spent with patient in examination room (obtaining history, performing physical exam, discussing diagnosis and management options), placing orders, and completing patient documentation.    Healthcare Maintenance:   - Cervical Cancer Screening / Pap Smear: PAP 7/2023 by Dr Deja Dinero with atypical squamous cells of undetermined significance  - Breast Cancer Screening / Mammogram: per ob/gyn  - Colon Cancer Screening / Colonoscopy:  last colonoscopy completed 8/2016 by Dr Gonzalez [facility]. Hyperplastic polyp removed. Negative for dysplasia/ malignancy. Only path report available for review-- no  office note or recommendation for when to repeat by Dr Monsivais   - HCV Screening: ordered   - Immunizations: discussed overdue zoster and covid booster   - Depression Screening: PHQ2 - 0, negative screening   - Annual GC / Chlamydia Screen: defer to obgyn    JOSE LUIS Saeed  [facility] [place]       Transcribed from ambient dictation for JOSE LUIS Parrish by Jaqueline Jackson.  08/10/23   13:44 EDT    Patient or patient representative verbalized consent to the visit recording.  I have personally performed the services described in this document as transcribed by the above individual, and it is both accurate and complete.    ASCVD 10 yr risk with FLP and info collected during 8/10 OV= 5.3%

## 2023-08-11 LAB
ALBUMIN SERPL-MCNC: 3.9 G/DL (ref 3.5–5.2)
ALBUMIN/GLOB SERPL: 1.3 G/DL
ALP SERPL-CCNC: 98 U/L (ref 39–117)
ALT SERPL-CCNC: 17 U/L (ref 1–33)
AST SERPL-CCNC: 17 U/L (ref 1–32)
BILIRUB SERPL-MCNC: 0.7 MG/DL (ref 0–1.2)
BUN SERPL-MCNC: 11 MG/DL (ref 8–23)
BUN/CREAT SERPL: 15.5 (ref 7–25)
CALCIUM SERPL-MCNC: 9.7 MG/DL (ref 8.6–10.5)
CHLORIDE SERPL-SCNC: 103 MMOL/L (ref 98–107)
CHOLEST SERPL-MCNC: 222 MG/DL (ref 0–200)
CO2 SERPL-SCNC: 25.3 MMOL/L (ref 22–29)
CREAT SERPL-MCNC: 0.71 MG/DL (ref 0.57–1)
EGFRCR SERPLBLD CKD-EPI 2021: 96.3 ML/MIN/1.73
ERYTHROCYTE [DISTWIDTH] IN BLOOD BY AUTOMATED COUNT: 12.7 % (ref 12.3–15.4)
GLOBULIN SER CALC-MCNC: 2.9 GM/DL
GLUCOSE SERPL-MCNC: 81 MG/DL (ref 65–99)
HBA1C MFR BLD: 5.6 % (ref 4.8–5.6)
HCT VFR BLD AUTO: 42.4 % (ref 34–46.6)
HCV IGG SERPL QL IA: NON REACTIVE
HDLC SERPL-MCNC: 50 MG/DL (ref 40–60)
HGB BLD-MCNC: 14.7 G/DL (ref 12–15.9)
LDLC SERPL CALC-MCNC: 139 MG/DL (ref 0–100)
MCH RBC QN AUTO: 29.9 PG (ref 26.6–33)
MCHC RBC AUTO-ENTMCNC: 34.7 G/DL (ref 31.5–35.7)
MCV RBC AUTO: 86.4 FL (ref 79–97)
PLATELET # BLD AUTO: 377 10*3/MM3 (ref 140–450)
POTASSIUM SERPL-SCNC: 4 MMOL/L (ref 3.5–5.2)
PROT SERPL-MCNC: 6.8 G/DL (ref 6–8.5)
RBC # BLD AUTO: 4.91 10*6/MM3 (ref 3.77–5.28)
SODIUM SERPL-SCNC: 139 MMOL/L (ref 136–145)
TRIGL SERPL-MCNC: 185 MG/DL (ref 0–150)
VLDLC SERPL CALC-MCNC: 33 MG/DL (ref 5–40)
WBC # BLD AUTO: 7.12 10*3/MM3 (ref 3.4–10.8)

## 2023-08-14 ENCOUNTER — TELEPHONE (OUTPATIENT)
Dept: INTERNAL MEDICINE | Facility: CLINIC | Age: 63
End: 2023-08-14
Payer: COMMERCIAL

## 2023-08-14 NOTE — TELEPHONE ENCOUNTER
Notified patient of results, patient verbalized understanding. No follow up questions at this time. Patient stated she was not fasting at the time of lab draw and she would like to change her diet before she starts medication.

## 2023-08-14 NOTE — TELEPHONE ENCOUNTER
----- Message from JOSE LUIS Long sent at 8/12/2023 11:48 PM EDT -----  The only thing item from annual lab work I will comment on is persistent, and slightly worse cholesterol levels. I do recommend consideration of a statin med at this time to bring numbers to goal range. I recommend this in addition to lifestyle modifications including diet, exercise, and weight loss. If interested in nutrition referral to meet with RD let me know. If ok with starting med let me know. If pt wishes to discuss this with me face to face first or has further questions/ concerns, please schedule office apt. (Ok to do telehealth if easier). Otherwise, f/u as scheduled. All other labs normal-- this includes:  blood counts, hcv screen, sugar, electrolytes, kidney & liver function.

## 2023-10-18 ENCOUNTER — OFFICE VISIT (OUTPATIENT)
Dept: ENDOCRINOLOGY | Facility: CLINIC | Age: 63
End: 2023-10-18
Payer: COMMERCIAL

## 2023-10-18 VITALS
WEIGHT: 185 LBS | DIASTOLIC BLOOD PRESSURE: 80 MMHG | HEIGHT: 71 IN | HEART RATE: 78 BPM | SYSTOLIC BLOOD PRESSURE: 122 MMHG | BODY MASS INDEX: 25.9 KG/M2

## 2023-10-18 DIAGNOSIS — E03.9 ACQUIRED HYPOTHYROIDISM: Primary | ICD-10-CM

## 2023-10-18 DIAGNOSIS — E55.9 VITAMIN D DEFICIENCY: ICD-10-CM

## 2023-10-18 LAB
25(OH)D3+25(OH)D2 SERPL-MCNC: 21.2 NG/ML (ref 30–100)
T4 FREE SERPL-MCNC: 1.44 NG/DL (ref 0.93–1.7)
TSH SERPL DL<=0.005 MIU/L-ACNC: 1.53 UIU/ML (ref 0.27–4.2)

## 2023-10-18 PROCEDURE — 99214 OFFICE O/P EST MOD 30 MIN: CPT | Performed by: INTERNAL MEDICINE

## 2023-10-18 PROCEDURE — 36415 COLL VENOUS BLD VENIPUNCTURE: CPT | Performed by: INTERNAL MEDICINE

## 2023-10-18 NOTE — PROGRESS NOTES
"Hypothyroidism    Subjective    China Gomes is a 63 y.o. female. she is here today for follow-up for evaluation of   Hypothyroidism, diagnosed in 2012.   Follow-up for hypothyroidism. Patient states she feels healthy overall. She also feels mildly fatigued.   On levothyroxine and the dose increased to 88 mcg and she felt much better initially. In the past several months the symptoms recur.      Vit D deficiency - level was 5.8. In 2/2018 - she started high dose weekly supplement.      Patient reported having some symptoms of fatigue, hair loss. She had estradiol dose in HRT reduced to 0.5 mg daily and sx started shortly after that.     Review of Systems  Review of Systems   Constitutional:  Positive for fatigue.   Skin:         Hair loss     Current medications:  Current Outpatient Medications   Medication Sig Dispense Refill    estradiol (ESTRACE) 1 MG tablet Take 1 tablet by mouth Daily.      famotidine (PEPCID) 20 MG tablet TAKE 1 TABLET BY MOUTH AT NIGHT AS NEEDED FOR HEARTBURN 90 tablet 0    levothyroxine (SYNTHROID, LEVOTHROID) 88 MCG tablet TAKE 1 TABLET BY MOUTH DAILY 90 tablet 1    Progesterone (PROMETRIUM) 200 MG capsule Take 1 capsule by mouth Daily.      vitamin D (ERGOCALCIFEROL) 1.25 MG (28329 UT) capsule capsule TAKE 1 CAPSULE BY MOUTH 1 TIME EVERY WEEK 13 capsule 3     No current facility-administered medications for this visit.         Objective      Vitals:    10/18/23 1001   BP: 122/80   Pulse: 78   Weight: 83.9 kg (185 lb)   Height: 180.3 cm (70.98\")   Body mass index is 25.81 kg/m².  Physical Exam   Constitutional: She is oriented to person, place, and time. She appears well-developed and well-nourished.   HENT:   Head: Normocephalic and atraumatic.   Cardiovascular: Normal rate, regular rhythm, normal heart sounds and normal pulses.   Pulmonary/Chest: Effort normal and breath sounds normal.   Musculoskeletal: No swelling.   Neurological: She is alert and oriented to person, place, and " time.   Skin: Skin is warm and dry.   Psychiatric: Her behavior is normal. Judgment and thought content normal.       LABS AND IMAGING  No visits with results within 1 Month(s) from this visit.   Latest known visit with results is:   Lab on 08/10/2023   Component Date Value Ref Range Status    Total Cholesterol 08/10/2023 222 (H)  0 - 200 mg/dL Final    Comment: Cholesterol Reference Ranges  (U.S. Department of Health and Human Services ATP III  Classifications)  Desirable          <200 mg/dL  Borderline High    200-239 mg/dL  High Risk          >240 mg/dL  Triglyceride Reference Ranges  (U.S. Department of Health and Human Services ATP III  Classifications)  Normal           <150 mg/dL  Borderline High  150-199 mg/dL  High             200-499 mg/dL  Very High        >500 mg/dL  HDL Reference Ranges  (U.S. Department of Health and Human Services ATP III  Classifications)  Low     <40 mg/dl (major risk factor for CHD)  High    >60 mg/dl ('negative' risk factor for CHD)  LDL Reference Ranges  (U.S. Department of Health and Human Services ATP III  Classifications)  Optimal          <100 mg/dL  Near Optimal     100-129 mg/dL  Borderline High  130-159 mg/dL  High             160-189 mg/dL  Very High        >189 mg/dL      Triglycerides 08/10/2023 185 (H)  0 - 150 mg/dL Final    HDL Cholesterol 08/10/2023 50  40 - 60 mg/dL Final    VLDL Cholesterol Mitchell 08/10/2023 33  5 - 40 mg/dL Final    LDL Chol Calc (NIH) 08/10/2023 139 (H)  0 - 100 mg/dL Final    Hemoglobin A1C 08/10/2023 5.60  4.80 - 5.60 % Final    Comment: Hemoglobin A1C Ranges:  Increased Risk for Diabetes  5.7% to 6.4%  Diabetes                     >= 6.5%  Diabetic Goal                < 7.0%      Glucose 08/10/2023 81  65 - 99 mg/dL Final    BUN 08/10/2023 11  8 - 23 mg/dL Final    Creatinine 08/10/2023 0.71  0.57 - 1.00 mg/dL Final    EGFR Result 08/10/2023 96.3  >60.0 mL/min/1.73 Final    Comment: GFR Normal >60  Chronic Kidney Disease <60  Kidney Failure  <15      BUN/Creatinine Ratio 08/10/2023 15.5  7.0 - 25.0 Final    Sodium 08/10/2023 139  136 - 145 mmol/L Final    Potassium 08/10/2023 4.0  3.5 - 5.2 mmol/L Final    Chloride 08/10/2023 103  98 - 107 mmol/L Final    Total CO2 08/10/2023 25.3  22.0 - 29.0 mmol/L Final    Calcium 08/10/2023 9.7  8.6 - 10.5 mg/dL Final    Total Protein 08/10/2023 6.8  6.0 - 8.5 g/dL Final    Albumin 08/10/2023 3.9  3.5 - 5.2 g/dL Final    Globulin 08/10/2023 2.9  gm/dL Final    A/G Ratio 08/10/2023 1.3  g/dL Final    Total Bilirubin 08/10/2023 0.7  0.0 - 1.2 mg/dL Final    Alkaline Phosphatase 08/10/2023 98  39 - 117 U/L Final    AST (SGOT) 08/10/2023 17  1 - 32 U/L Final    ALT (SGPT) 08/10/2023 17  1 - 33 U/L Final    WBC 08/10/2023 7.12  3.40 - 10.80 10*3/mm3 Final    RBC 08/10/2023 4.91  3.77 - 5.28 10*6/mm3 Final    Hemoglobin 08/10/2023 14.7  12.0 - 15.9 g/dL Final    Hematocrit 08/10/2023 42.4  34.0 - 46.6 % Final    MCV 08/10/2023 86.4  79.0 - 97.0 fL Final    MCH 08/10/2023 29.9  26.6 - 33.0 pg Final    MCHC 08/10/2023 34.7  31.5 - 35.7 g/dL Final    RDW 08/10/2023 12.7  12.3 - 15.4 % Final    Platelets 08/10/2023 377  140 - 450 10*3/mm3 Final    Hep C Virus Ab 08/10/2023 Non Reactive  Non Reactive Final    Comment: HCV antibody alone does not differentiate between previously  resolved infection and active infection. Equivocal and Reactive  HCV antibody results should be followed up with an HCV RNA test  to support the diagnosis of active HCV infection.         1. Acquired hypothyroidism    2. Vitamin D deficiency        Assessment & Plan      Problem List Items Addressed This Visit          Other    Hypothyroidism - Primary    Vitamin D deficiency         PLAN  Continue same dose levothyroxine 88 µg. Repeat labs for dose adjustments. She experiences some symptoms of hypothyroidism.      Cont  vitamin D 50 000 IU weekly. Repeat labs today     Recent labs reviewed: Lipid panel is normal except slightly elevated LDL, normal  CMP and a1C     Follow-up in 12 months.

## 2023-10-21 DIAGNOSIS — E03.9 ACQUIRED HYPOTHYROIDISM: ICD-10-CM

## 2023-10-23 RX ORDER — LEVOTHYROXINE SODIUM 88 UG/1
88 TABLET ORAL DAILY
Qty: 90 TABLET | Refills: 0 | Status: SHIPPED | OUTPATIENT
Start: 2023-10-23

## 2023-10-23 NOTE — TELEPHONE ENCOUNTER
Rx Refill Note  Requested Prescriptions     Pending Prescriptions Disp Refills    levothyroxine (SYNTHROID, LEVOTHROID) 88 MCG tablet [Pharmacy Med Name: LEVOTHYROXINE 0.088MG (88MCG) TAB] 90 tablet 1     Sig: TAKE 1 TABLET BY MOUTH DAILY      Last office visit with prescribing clinician: 10/18/2023   Last telemedicine visit with prescribing clinician: Visit date not found   Next office visit with prescribing clinician: 10/18/2024                         Would you like a call back once the refill request has been completed: [] Yes [] No    If the office needs to give you a call back, can they leave a voicemail: [] Yes [] No    Shelia Mehta MA  10/23/23, 13:55 EDT

## 2024-03-20 DIAGNOSIS — E03.9 ACQUIRED HYPOTHYROIDISM: ICD-10-CM

## 2024-03-20 RX ORDER — LEVOTHYROXINE SODIUM 88 UG/1
88 TABLET ORAL DAILY
Qty: 90 TABLET | Refills: 2 | Status: SHIPPED | OUTPATIENT
Start: 2024-03-20

## 2024-03-20 NOTE — TELEPHONE ENCOUNTER
PATIENT IS GOING OUT OF TOWN TOMORROW AND IS OUT OF MEDICATION AS OF TODAY. SHE NEEDS US TO SEND REFILLS TO PHARMACY ASAP.

## 2024-03-20 NOTE — TELEPHONE ENCOUNTER
Rx Refill Note  Requested Prescriptions     Pending Prescriptions Disp Refills    levothyroxine (SYNTHROID, LEVOTHROID) 88 MCG tablet [Pharmacy Med Name: LEVOTHYROXINE 0.088MG (88MCG) TAB] 90 tablet 0     Sig: TAKE 1 TABLET BY MOUTH DAILY        Last office visit with prescribing clinician: 10/18/23     Next office visit with prescribing clinician: 9/4/24      Minda Bowie (Jodi)  03/20/24, 11:41 EDT

## 2024-04-08 ENCOUNTER — OFFICE VISIT (OUTPATIENT)
Dept: INTERNAL MEDICINE | Facility: CLINIC | Age: 64
End: 2024-04-08
Payer: COMMERCIAL

## 2024-04-08 VITALS
SYSTOLIC BLOOD PRESSURE: 134 MMHG | OXYGEN SATURATION: 98 % | HEART RATE: 90 BPM | WEIGHT: 185 LBS | BODY MASS INDEX: 25.9 KG/M2 | DIASTOLIC BLOOD PRESSURE: 78 MMHG | HEIGHT: 71 IN

## 2024-04-08 DIAGNOSIS — R21 RASH AND NONSPECIFIC SKIN ERUPTION: ICD-10-CM

## 2024-04-08 DIAGNOSIS — L03.116 CELLULITIS OF LEFT LEG: Primary | ICD-10-CM

## 2024-04-08 PROCEDURE — 99213 OFFICE O/P EST LOW 20 MIN: CPT | Performed by: NURSE PRACTITIONER

## 2024-04-08 RX ORDER — METHYLPREDNISOLONE 4 MG/1
TABLET ORAL
Qty: 21 TABLET | Refills: 0 | Status: SHIPPED | OUTPATIENT
Start: 2024-04-08

## 2024-04-08 RX ORDER — SULFAMETHOXAZOLE AND TRIMETHOPRIM 800; 160 MG/1; MG/1
1 TABLET ORAL 2 TIMES DAILY
Qty: 20 TABLET | Refills: 0 | Status: SHIPPED | OUTPATIENT
Start: 2024-04-08 | End: 2024-04-18

## 2024-04-08 NOTE — PROGRESS NOTES
Office Note     Name: China Gomes    : 1960     MRN: [id number]     Chief Complaint  Insect Bite (Swollen, painful, itchy, warm to touch.//Reaction to keflex possibly.)    Subjective     History of Present Illness:  China Gomes is a 64 y.o. female who presents today for evaluation of a possible insect bite to her left lower extremity.  Patient reports 3 weeks ago she went to urgent care for initial evaluation.  She was treated with oral Keflex at that time as well as a Medrol Dosepak and triamcinolone cream.  She reports her symptoms did not resolve with this medication and she has ongoing pain, swelling, and a rash to her left ankle and foot area.  She also reports going to [facility] ED on 3/11 for nausea, vomiting, and abdominal pain.  She was treated for gastritis at that time.  She is unsure what has happened to her left lower extremity.  She is concerned she may have been bitten by an insect.  She is also concerned she may have been having a reaction to the Keflex.  Her left lateral leg and ankle area are red, swollen, warm to touch, painful, and itchy.  She has also taken some Lasix as needed to assist with the swelling.  She presents today for evaluation of the above acute complaints.  No further complaints or concerns at this time.        Past Medical History:   Diagnosis Date    GERD (gastroesophageal reflux disease)     Hashimoto's disease     Hypothyroidism        Past Surgical History:   Procedure Laterality Date    CHOLECYSTECTOMY      CYST REMOVAL Left     HX OVARIAN CYSTECTOMY Bilateral     OTHER SURGICAL HISTORY      ear pressure equalization tube, insertion, bilaterally    TYMPANOPLASTY         Social History     Socioeconomic History    Marital status:    Tobacco Use    Smoking status: Never    Smokeless tobacco: Never   Vaping Use    Vaping status: Never Used   Substance and Sexual Activity    Alcohol use: Yes     Comment: seldom, 1-2 drinks per year     "Drug use: No    Sexual activity: Yes     Partners: Male         Current Outpatient Medications:     estradiol (ESTRACE) 1 MG tablet, Take 0.5 tablets by mouth Daily., Disp: , Rfl:     famotidine (PEPCID) 20 MG tablet, TAKE 1 TABLET BY MOUTH AT NIGHT AS NEEDED FOR HEARTBURN (Patient not taking: Reported on 4/18/2024), Disp: 90 tablet, Rfl: 0    levothyroxine (SYNTHROID, LEVOTHROID) 88 MCG tablet, TAKE 1 TABLET BY MOUTH DAILY, Disp: 90 tablet, Rfl: 2    Progesterone (PROMETRIUM) 200 MG capsule, Take 1 capsule by mouth Daily., Disp: , Rfl:     vitamin D (ERGOCALCIFEROL) 1.25 MG (60080 UT) capsule capsule, TAKE 1 CAPSULE BY MOUTH 1 TIME EVERY WEEK, Disp: 13 capsule, Rfl: 3    methylPREDNISolone (MEDROL) 4 MG dose pack, Take as directed on package instructions., Disp: 21 tablet, Rfl: 0    mupirocin (BACTROBAN) 2 % ointment, Apply 1 Application topically to the appropriate area as directed 3 (Three) Times a Day., Disp: 30 g, Rfl: 1    Omeprazole Magnesium (PRILOSEC PO), , Disp: , Rfl:     saccharomyces boulardii (Florastor) 250 MG capsule, Take 1 capsule by mouth 2 (Two) Times a Day for 10 days., Disp: 20 capsule, Rfl: 0    sulfamethoxazole-trimethoprim (Bactrim DS) 800-160 MG per tablet, Take 1 tablet by mouth 2 (Two) Times a Day for 10 days. (Patient not taking: Reported on 4/18/2024), Disp: 20 tablet, Rfl: 0    Tretinoin 0.05 % lotion, , Disp: , Rfl:     Objective     Vital Signs  /78   Pulse 90   Ht 180.3 cm (70.98\")   Wt 83.9 kg (185 lb)   SpO2 98%   BMI 25.82 kg/m²   Estimated body mass index is 25.82 kg/m² as calculated from the following:    Height as of this encounter: 180.3 cm (70.98\").    Weight as of this encounter: 83.9 kg (185 lb).    BMI is >= 25 and <30. (Overweight) The following options were offered after discussion;: Not addressed at this visit      Physical Exam  Constitutional:       General: She is not in acute distress.     Appearance: Normal appearance. She is not ill-appearing.   HENT: "      Head: Normocephalic and atraumatic.      Nose: Nose normal.   Eyes:      Extraocular Movements: Extraocular movements intact.      Conjunctiva/sclera: Conjunctivae normal.      Pupils: Pupils are equal, round, and reactive to light.   Cardiovascular:      Rate and Rhythm: Normal rate.   Pulmonary:      Effort: Pulmonary effort is normal. No respiratory distress.   Musculoskeletal:         General: Normal range of motion.      Cervical back: Neck supple.      Left lower leg: Edema present.      Comments: Swelling, redness, warmth noted to the left lateral leg and ankle area.  Swelling noted to left foot.  Rash present to leg and ankle area as well.  Scattered, scabbed over lesions noted.  Surrounding erythema noted.   Skin:     General: Skin is warm and dry.   Neurological:      General: No focal deficit present.      Mental Status: She is alert and oriented to person, place, and time. Mental status is at baseline.   Psychiatric:         Mood and Affect: Mood normal.         Behavior: Behavior normal.         Thought Content: Thought content normal.         Judgment: Judgment normal.          Assessment and Plan     Diagnoses and all orders for this visit:    1. Cellulitis of left leg (Primary)  -     sulfamethoxazole-trimethoprim (Bactrim DS) 800-160 MG per tablet; Take 1 tablet by mouth 2 (Two) Times a Day for 10 days. (Patient not taking: Reported on 4/18/2024)  Dispense: 20 tablet; Refill: 0  -     methylPREDNISolone (MEDROL) 4 MG dose pack; Take as directed on package instructions.  Dispense: 21 tablet; Refill: 0    2. Rash and nonspecific skin eruption        Follow Up  Return in about 1 week (around 4/15/2024) for Recheck.    JOSE LUIS Dong    Part of this note may be an electronic transcription/translation of spoken language to printed text using the Dragon Dictation System.

## 2024-04-18 ENCOUNTER — OFFICE VISIT (OUTPATIENT)
Dept: INTERNAL MEDICINE | Facility: CLINIC | Age: 64
End: 2024-04-18
Payer: COMMERCIAL

## 2024-04-18 VITALS
OXYGEN SATURATION: 97 % | SYSTOLIC BLOOD PRESSURE: 126 MMHG | HEART RATE: 95 BPM | WEIGHT: 181 LBS | DIASTOLIC BLOOD PRESSURE: 80 MMHG | BODY MASS INDEX: 25.26 KG/M2

## 2024-04-18 DIAGNOSIS — R19.7 DIARRHEA, UNSPECIFIED TYPE: ICD-10-CM

## 2024-04-18 DIAGNOSIS — L03.116 CELLULITIS OF LEFT LEG: Primary | ICD-10-CM

## 2024-04-18 RX ORDER — SACCHAROMYCES BOULARDII 250 MG
250 CAPSULE ORAL 2 TIMES DAILY
Qty: 20 CAPSULE | Refills: 0 | Status: SHIPPED | OUTPATIENT
Start: 2024-04-18 | End: 2024-04-28

## 2024-04-18 NOTE — PROGRESS NOTES
Office Note     Name: China Gomes    : 1960     MRN: [id number]     Chief Complaint  Cellulitis of left leg (1 week follow-up)    Subjective     History of Present Illness:  China Gomes is a 64 y.o. female who presents today for follow-up on left leg cellulitis.  Patient was started on Bactrim double strength twice daily at the previous visit.  Patient reports she took this antibiotic consistently for 4 days but then decided to stop due to experiencing diarrhea.  Patient denies excessive, watery diarrhea.  She thinks the antibiotic may have just upset her stomach.  She did not take any probiotics with the antibiotic.  She had also been applying triamcinolone cream she was previously prescribed at urgent care.  The swelling to her left leg and ankle are improving as well as the rash is improving.  She would like to try a different ointment or cream to see if that would help better than what she has been using.  She is also continuing to take the oral steroid.  She is just taking a few doses a day, not as directed with the Medrol Dosepak.  Overall, she does feel her symptoms are improving and she is pleased.  The pain has also improved.  She will try to restart the antibiotics to see if she can finish the course.  No further complaints or concerns at this time.      Past Medical History:   Diagnosis Date    GERD (gastroesophageal reflux disease)     Hashimoto's disease     Hypothyroidism        Past Surgical History:   Procedure Laterality Date    CHOLECYSTECTOMY      CYST REMOVAL Left     HX OVARIAN CYSTECTOMY Bilateral     OTHER SURGICAL HISTORY      ear pressure equalization tube, insertion, bilaterally    TYMPANOPLASTY         Social History     Socioeconomic History    Marital status:    Tobacco Use    Smoking status: Never    Smokeless tobacco: Never   Vaping Use    Vaping status: Never Used   Substance and Sexual Activity    Alcohol use: Yes     Comment: seldom, 1-2 drinks per  "year    Drug use: No    Sexual activity: Yes     Partners: Male         Current Outpatient Medications:     estradiol (ESTRACE) 1 MG tablet, Take 0.5 tablets by mouth Daily., Disp: , Rfl:     levothyroxine (SYNTHROID, LEVOTHROID) 88 MCG tablet, TAKE 1 TABLET BY MOUTH DAILY, Disp: 90 tablet, Rfl: 2    methylPREDNISolone (MEDROL) 4 MG dose pack, Take as directed on package instructions., Disp: 21 tablet, Rfl: 0    Omeprazole Magnesium (PRILOSEC PO), , Disp: , Rfl:     Progesterone (PROMETRIUM) 200 MG capsule, Take 1 capsule by mouth Daily., Disp: , Rfl:     Tretinoin 0.05 % lotion, , Disp: , Rfl:     vitamin D (ERGOCALCIFEROL) 1.25 MG (83124 UT) capsule capsule, TAKE 1 CAPSULE BY MOUTH 1 TIME EVERY WEEK, Disp: 13 capsule, Rfl: 3    famotidine (PEPCID) 20 MG tablet, TAKE 1 TABLET BY MOUTH AT NIGHT AS NEEDED FOR HEARTBURN (Patient not taking: Reported on 4/18/2024), Disp: 90 tablet, Rfl: 0    mupirocin (BACTROBAN) 2 % ointment, Apply 1 Application topically to the appropriate area as directed 3 (Three) Times a Day., Disp: 30 g, Rfl: 1    saccharomyces boulardii (Florastor) 250 MG capsule, Take 1 capsule by mouth 2 (Two) Times a Day for 10 days., Disp: 20 capsule, Rfl: 0    sulfamethoxazole-trimethoprim (Bactrim DS) 800-160 MG per tablet, Take 1 tablet by mouth 2 (Two) Times a Day for 10 days. (Patient not taking: Reported on 4/18/2024), Disp: 20 tablet, Rfl: 0    Objective     Vital Signs  /80 (BP Location: Right arm, Patient Position: Sitting, Cuff Size: Adult)   Pulse 95   Wt 82.1 kg (181 lb)   SpO2 97%   BMI 25.26 kg/m²   Estimated body mass index is 25.26 kg/m² as calculated from the following:    Height as of 4/8/24: 180.3 cm (70.98\").    Weight as of this encounter: 82.1 kg (181 lb).    BMI is >= 25 and <30. (Overweight) The following options were offered after discussion;: Not addressed at this visit      Physical Exam  Constitutional:       General: She is not in acute distress.     Appearance: Normal " appearance. She is not ill-appearing.   HENT:      Head: Normocephalic and atraumatic.      Nose: Nose normal.   Eyes:      Extraocular Movements: Extraocular movements intact.      Conjunctiva/sclera: Conjunctivae normal.      Pupils: Pupils are equal, round, and reactive to light.   Cardiovascular:      Rate and Rhythm: Normal rate.   Pulmonary:      Effort: Pulmonary effort is normal. No respiratory distress.   Musculoskeletal:         General: Normal range of motion.      Cervical back: Neck supple.      Left lower leg: Edema present.      Comments: Left lower extremity and ankle swelling present but improved from previous visit, rash to lateral left ankle improved from previous evaluation.  Redness resolved.   Skin:     General: Skin is warm and dry.   Neurological:      General: No focal deficit present.      Mental Status: She is alert and oriented to person, place, and time. Mental status is at baseline.   Psychiatric:         Mood and Affect: Mood normal.         Behavior: Behavior normal.         Thought Content: Thought content normal.         Judgment: Judgment normal.          Assessment and Plan     Diagnoses and all orders for this visit:    1. Cellulitis of left leg (Primary)  -     mupirocin (BACTROBAN) 2 % ointment; Apply 1 Application topically to the appropriate area as directed 3 (Three) Times a Day.  Dispense: 30 g; Refill: 1    2. Diarrhea, unspecified type  -     saccharomyces boulardii (Florastor) 250 MG capsule; Take 1 capsule by mouth 2 (Two) Times a Day for 10 days.  Dispense: 20 capsule; Refill: 0        Follow Up  Return if symptoms worsen or fail to improve.    JOSE LUIS Dong    Part of this note may be an electronic transcription/translation of spoken language to printed text using the Dragon Dictation System.

## 2024-06-10 NOTE — TELEPHONE ENCOUNTER
Rx Refill Note  Requested Prescriptions     Pending Prescriptions Disp Refills    vitamin D (ERGOCALCIFEROL) 1.25 MG (50102 UT) capsule capsule [Pharmacy Med Name: VITAMIN D2 50,000IU (ERGO) CAP RX] 13 capsule 3     Sig: TAKE 1 CAPSULE BY MOUTH 1 TIME EVERY WEEK      Last office visit with prescribing clinician: 10/18/2023   Last telemedicine visit with prescribing clinician: Visit date not found   Next office visit with prescribing clinician: 9/4/2024                         Would you like a call back once the refill request has been completed: [] Yes [] No    If the office needs to give you a call back, can they leave a voicemail: [] Yes [] No    Shelia Mehta MA  06/10/24, 10:31 EDT

## 2024-06-11 RX ORDER — ERGOCALCIFEROL 1.25 MG/1
CAPSULE ORAL
Qty: 13 CAPSULE | Refills: 3 | Status: SHIPPED | OUTPATIENT
Start: 2024-06-11

## 2024-08-13 ENCOUNTER — TELEPHONE (OUTPATIENT)
Dept: INTERNAL MEDICINE | Facility: CLINIC | Age: 64
End: 2024-08-13

## 2024-08-13 ENCOUNTER — OFFICE VISIT (OUTPATIENT)
Dept: INTERNAL MEDICINE | Facility: CLINIC | Age: 64
End: 2024-08-13
Payer: COMMERCIAL

## 2024-08-13 VITALS
HEART RATE: 84 BPM | DIASTOLIC BLOOD PRESSURE: 86 MMHG | BODY MASS INDEX: 26.46 KG/M2 | SYSTOLIC BLOOD PRESSURE: 128 MMHG | WEIGHT: 189 LBS | HEIGHT: 71 IN | OXYGEN SATURATION: 97 %

## 2024-08-13 DIAGNOSIS — L03.116 CELLULITIS OF LEFT LEG: Primary | ICD-10-CM

## 2024-08-13 DIAGNOSIS — B37.31 VAGINAL YEAST INFECTION: ICD-10-CM

## 2024-08-13 PROCEDURE — 99213 OFFICE O/P EST LOW 20 MIN: CPT | Performed by: NURSE PRACTITIONER

## 2024-08-13 RX ORDER — CEPHALEXIN 500 MG/1
500 CAPSULE ORAL 3 TIMES DAILY
Qty: 21 CAPSULE | Refills: 0 | Status: SHIPPED | OUTPATIENT
Start: 2024-08-13 | End: 2024-08-20

## 2024-08-13 RX ORDER — FLUCONAZOLE 150 MG/1
150 TABLET ORAL AS NEEDED
Qty: 5 TABLET | Refills: 0 | Status: SHIPPED | OUTPATIENT
Start: 2024-08-13

## 2024-08-13 NOTE — PROGRESS NOTES
Office Note     Name: China Gomes    : 1960     MRN: [id number]     Chief Complaint  Leg Pain    Subjective     History of Present Illness:  China Gomes is a 64 y.o. female who presents today for acute concerns related to leg pain and swelling    Patient was seen back in April for concerns of cellulitis.  It appears she did go to the ER prior to that time and was placed on a cephalosporin.  She was then put on Bactrim and steroids.  Patient feels that the Bactrim possibly caused her to go to the ER with very negative GI symptoms.  Her sister who is a PA looked at her leg at the end of  and was concern for possible cellulitis.  Patient is overall concern is for possible MRSA or Lyme disease.  She has been doing Epsom salt soaks.  She has taken doxycycline twice a day for the last 1.5 weeks.  Limited resolution of symptoms.  She has used triamcinolone cream which has helped.  She is already established with dermatology office.  She did not feel the mupirocin was helping.  She states nothing is draining from the area.          Past Medical History:   Diagnosis Date    GERD (gastroesophageal reflux disease)     Hashimoto's disease 2005    Hypothyroidism        Past Surgical History:   Procedure Laterality Date    CHOLECYSTECTOMY      CYST REMOVAL Left     HX OVARIAN CYSTECTOMY Bilateral     OTHER SURGICAL HISTORY      ear pressure equalization tube, insertion, bilaterally    TYMPANOPLASTY         Social History     Socioeconomic History    Marital status:    Tobacco Use    Smoking status: Never    Smokeless tobacco: Never   Vaping Use    Vaping status: Never Used   Substance and Sexual Activity    Alcohol use: Yes     Comment: seldom, 1-2 drinks per year    Drug use: No    Sexual activity: Yes     Partners: Male         Current Outpatient Medications:     estradiol (ESTRACE) 1 MG tablet, Take 0.5 tablets by mouth Daily., Disp: , Rfl:     levothyroxine (SYNTHROID, LEVOTHROID) 88 MCG  "tablet, TAKE 1 TABLET BY MOUTH DAILY, Disp: 90 tablet, Rfl: 2    mupirocin (BACTROBAN) 2 % ointment, Apply 1 Application topically to the appropriate area as directed 3 (Three) Times a Day., Disp: 30 g, Rfl: 1    Omeprazole Magnesium (PRILOSEC PO), , Disp: , Rfl:     Progesterone (PROMETRIUM) 200 MG capsule, Take 1 capsule by mouth Daily., Disp: , Rfl:     Tretinoin 0.05 % lotion, , Disp: , Rfl:     vitamin D (ERGOCALCIFEROL) 1.25 MG (54383 UT) capsule capsule, TAKE 1 CAPSULE BY MOUTH 1 TIME EVERY WEEK, Disp: 13 capsule, Rfl: 3    cephalexin (Keflex) 500 MG capsule, Take 1 capsule by mouth 3 (Three) Times a Day for 7 days., Disp: 21 capsule, Rfl: 0    fluconazole (Diflucan) 150 MG tablet, Take 1 tablet by mouth As Needed (vagin yeast infection)., Disp: 5 tablet, Rfl: 0    Objective     Vital Signs  /86   Pulse 84   Ht 180.3 cm (70.98\")   Wt 85.7 kg (189 lb)   SpO2 97%   BMI 26.38 kg/m²   Estimated body mass index is 26.38 kg/m² as calculated from the following:    Height as of this encounter: 180.3 cm (70.98\").    Weight as of this encounter: 85.7 kg (189 lb).               Physical Exam  Vitals and nursing note reviewed.   Constitutional:       Appearance: Normal appearance.   HENT:      Head: Normocephalic and atraumatic.   Eyes:      Extraocular Movements: Extraocular movements intact.      Pupils: Pupils are equal, round, and reactive to light.   Cardiovascular:      Rate and Rhythm: Normal rate and regular rhythm.      Heart sounds: Normal heart sounds.   Pulmonary:      Effort: Pulmonary effort is normal.      Breath sounds: Normal breath sounds.   Musculoskeletal:         General: Normal range of motion.   Skin:     General: Skin is warm and dry.      Comments: Lateral aspect of the left lower extremity with patient's noted area of concern.  There was a circular erythematous area.  There was one raised area that was flesh-colored.  The raised area was about 0.25 cm in diameter.  No active drainage. "  Tenderness to palpation.  There was noted swelling of the foot below the area.   Neurological:      Mental Status: She is alert and oriented to person, place, and time.   Psychiatric:         Mood and Affect: Mood normal.         Behavior: Behavior normal.            Assessment and Plan     Diagnoses and all orders for this visit:    1. Cellulitis of left leg (Primary)  -     cephalexin (Keflex) 500 MG capsule; Take 1 capsule by mouth 3 (Three) Times a Day for 7 days.  Dispense: 21 capsule; Refill: 0    2. Vaginal yeast infection  -     fluconazole (Diflucan) 150 MG tablet; Take 1 tablet by mouth As Needed (vagin yeast infection).  Dispense: 5 tablet; Refill: 0    Plan  Discussed with patient that she will be treated for possible cellulitis  She does have a concern for possible Lyme disease.  We did discuss that the treatment with doxycycline would have been appropriate.  Patient would like to hold on further testing if possible.  Bactrim and doxycycline would have also been appropriate for her possible MRSA concern.  Continue with Epsom salt soaks  Continue with triamcinolone cream  Patient will be leaving for out of state on an airplane tomorrow.  I did encourage use of compression socks as well as keeping feet elevated and staying well-hydrated.  I also encourage patient to make a follow-up appointment with dermatology as she is an established patient.  Go to ER if any condition worsens or severe  Plan to schedule annual physical exam at her leisure  Patient also gets vaginal yeast infections with antibiotics.    Follow Up  Return for return for physical at her availability.    JOSE LUIS Shah    Part of this note may be an electronic transcription/translation of spoken language to printed text using the Dragon Dictation System.

## 2024-08-13 NOTE — PROGRESS NOTES
Annual Physical     Name: China Gomes    : 1960     MRN: [id number]     Chief Complaint  No chief complaint on file.    Subjective     History of Present Illness:  China Gomes is a 64 y.o. female who presents today for annual physical exam and establish care with new provider in our office    Patient currently follows with endocrinology for hypothyroidism.  First diagnosis was in .  She is currently on levothyroxine    Patient currently also takes supplemental vitamin D due to vitamin D deficiency    Patient does follow with Dr. Keenan for her long-term history of back pain with ablation.    Patient follows with Dr. Perera.  She is currently on hormone replacement therapy.    Patient does have some hearing impairment with use of hearing aids      Care gaps were reviewed prior to today's visit  Colonoscopy completed   Pap smear dated   Tetanus shot completed   Recommendations for mammogram, shingles immunization    The patient is being seen for a health maintenance evaluation.    Past Medical History:   Diagnosis Date    GERD (gastroesophageal reflux disease)     Hashimoto's disease     Hypothyroidism        Past Surgical History:   Procedure Laterality Date    CHOLECYSTECTOMY      CYST REMOVAL Left     HX OVARIAN CYSTECTOMY Bilateral     OTHER SURGICAL HISTORY      ear pressure equalization tube, insertion, bilaterally    TYMPANOPLASTY         Social History     Socioeconomic History    Marital status:    Tobacco Use    Smoking status: Never    Smokeless tobacco: Never   Vaping Use    Vaping status: Never Used   Substance and Sexual Activity    Alcohol use: Yes     Comment: seldom, 1-2 drinks per year    Drug use: No    Sexual activity: Yes     Partners: Male         Current Outpatient Medications:     estradiol (ESTRACE) 1 MG tablet, Take 0.5 tablets by mouth Daily., Disp: , Rfl:     famotidine (PEPCID) 20 MG tablet, TAKE 1 TABLET BY MOUTH AT NIGHT AS NEEDED FOR  HEARTBURN (Patient not taking: Reported on 4/18/2024), Disp: 90 tablet, Rfl: 0    levothyroxine (SYNTHROID, LEVOTHROID) 88 MCG tablet, TAKE 1 TABLET BY MOUTH DAILY, Disp: 90 tablet, Rfl: 2    methylPREDNISolone (MEDROL) 4 MG dose pack, Take as directed on package instructions., Disp: 21 tablet, Rfl: 0    mupirocin (BACTROBAN) 2 % ointment, Apply 1 Application topically to the appropriate area as directed 3 (Three) Times a Day., Disp: 30 g, Rfl: 1    Omeprazole Magnesium (PRILOSEC PO), , Disp: , Rfl:     Progesterone (PROMETRIUM) 200 MG capsule, Take 1 capsule by mouth Daily., Disp: , Rfl:     Tretinoin 0.05 % lotion, , Disp: , Rfl:     vitamin D (ERGOCALCIFEROL) 1.25 MG (10287 UT) capsule capsule, TAKE 1 CAPSULE BY MOUTH 1 TIME EVERY WEEK, Disp: 13 capsule, Rfl: 3    General History  China  {DOES/DOES NOT:86087} have regular dental visits.  She {DOES/DOES NOT:93134} complain of vision problems. Last eye exam was ***.  Immunizations {ARE/ARE NOT:42344} up to date. The patient needs the following immunizations: ***    Lifestyle  China  consumes {Desc; diets:17678}.  She exercises {desc; exercise:92846}.    Reproductive Health  China  is postmenopausal.  She reports periods are {FREQUENCIES; PERIOD MENSES:46242}.  She {IS/ IS NOT:67683} sexually active. Her contraceptive plan is {PLAN CONTRACEPTION:197141}.    Screening  Last pap was 7/2023  Last Completed Pap Smear            PAP SMEAR (Every 3 Years) Next due on 7/19/2026 07/19/2023  Done    11/23/2018  Done                . History of abnormal pap smear or family history of gyn cancer: ***        Last mammogram was   Last Completed Mammogram       This patient has no relevant Health Maintenance data.        . Personal or family history of abnormal mammograms or breast cancer: ***        Last colonoscopy was 2016  Last Completed Colonoscopy            COLORECTAL CANCER SCREENING (COLONOSCOPY - Every 10 Years) Next due on 10/27/2026      10/27/2016   COLONOSCOPY (Done)                . Family history of colon cancer: ***        Last DEXA was ***.    Advance Care Planning   {Advance Directive Status:73665}       Health Maintenance Summary            Overdue - ZOSTER VACCINE (1 of 2) Never done      09/15/2022  Postponed until 9/15/2023 by Maribel Amin MA (Pending event)              Overdue - MAMMOGRAM (Every 2 Years) Overdue since 2/18/2021 02/18/2019  SCANNED - MAMMO              Overdue - ANNUAL PHYSICAL (Yearly) Overdue since 8/10/2024      08/10/2023  Done              Postponed - COVID-19 Vaccine (1 - 2023-24 season) Postponed until 11/1/2024 08/12/2024  Postponed until 11/1/2024 by Veronica Jonas MA (Product Unavailable)    08/10/2023  Postponed until 8/29/2025 by Maribel Amin MA (Patient Refused)    01/19/2023  Postponed until 2/7/2023 by Maribel Amin MA (Pending event)    09/15/2022  Postponed until 10/4/2022 by Maribel Amin MA (Pending event)              Postponed - INFLUENZA VACCINE (Yearly - August to March) Postponed until 3/31/2025      08/12/2024  Postponed until 3/31/2025 by Veronica Jonas MA (Product Unavailable)    01/19/2023  Postponed until 3/31/2023 by Maribel Amin MA (Patient Refused)    01/03/2019  Declined              BMI FOLLOWUP (Yearly) Next due on 4/18/2025 04/18/2024  Smart[employer]: WORKFLOW - QUALITY MEASUREMENT - DOCUMENTED WEIGHT FOLLOW-UP PLAN    04/08/2024  Smart[employer]: WORKFLOW - QUALITY MEASUREMENT - DOCUMENTED WEIGHT FOLLOW-UP PLAN              TDAP/TD VACCINES (2 - Td or Tdap) Next due on 6/8/2026 01/19/2023  Postponed until 1/19/2023 by Maribel Amin MA (Pending event)    12/22/2022  Postponed until 12/22/2022 by Maribel Amin MA (Pending event)    09/15/2022  Postponed until 9/15/2022 by Maribel Amin MA (Pending event)    06/08/2016  Imm Admin: Tdap              PAP SMEAR (Every 3 Years) Next due on 7/19/2026 07/19/2023  Done    11/23/2018  Done               "COLORECTAL CANCER SCREENING (COLONOSCOPY - Every 10 Years) Next due on 10/27/2026      10/27/2016  COLONOSCOPY (Done)              HEPATITIS C SCREENING  Completed      08/10/2023  Hep C Virus Ab component of Hepatitis C Antibody              Pneumococcal Vaccine 0-64 (Series Information) Aged Out      No completion, postpone, or frequency change history exists for this topic.                  Immunization History   Administered Date(s) Administered    Hepatitis A 12/04/2018, 06/06/2019    Tdap 06/08/2016       Review of Systems    Objective     Vital Signs  There were no vitals taken for this visit.  Estimated body mass index is 25.26 kg/m² as calculated from the following:    Height as of 4/8/24: 180.3 cm (70.98\").    Weight as of 4/18/24: 82.1 kg (181 lb).            PHQ-9 Depression Screening  Little interest or pleasure in doing things?     Feeling down, depressed, or hopeless?     Trouble falling or staying asleep, or sleeping too much?     Feeling tired or having little energy?     Poor appetite or overeating?     Feeling bad about yourself - or that you are a failure or have let yourself or your family down?     Trouble concentrating on things, such as reading the newspaper or watching television?     Moving or speaking so slowly that other people could have noticed? Or the opposite - being so fidgety or restless that you have been moving around a lot more than usual?     Thoughts that you would be better off dead, or of hurting yourself in some way?     PHQ-9 Total Score     If you checked off any problems, how difficult have these problems made it for you to do your work, take care of things at home, or get along with other people?       PHQ-9 Total Score:      ROSE MARY-7       Physical Exam     Lab Review:  {Ambulatory Labs (Optional):66510}         Assessment and Plan     There are no diagnoses linked to this encounter.    Follow Up  No follow-ups on file.    JOSE LUIS Shah    Part of this note may " be an electronic transcription/translation of spoken language to printed text using the Dragon Dictation System.

## 2024-08-13 NOTE — TELEPHONE ENCOUNTER
Caller: [id number] - [place], [place] - [address] AT [facility] [address] - [phone]  - [phone] FX    Relationship: Pharmacy    Best call back number: [phone]     Which medication are you concerned about: fluconazole (Diflucan) 150 MG tablet     What are your concerns: PHARMACY WOULD LIKE TO KNOW HOW OFTEN THE PATIENT NEEDS TO TAKE THIS MEDICATION.

## 2024-09-04 ENCOUNTER — OFFICE VISIT (OUTPATIENT)
Dept: ENDOCRINOLOGY | Facility: CLINIC | Age: 64
End: 2024-09-04
Payer: COMMERCIAL

## 2024-09-04 VITALS
HEART RATE: 71 BPM | HEIGHT: 71 IN | SYSTOLIC BLOOD PRESSURE: 122 MMHG | WEIGHT: 185 LBS | BODY MASS INDEX: 25.9 KG/M2 | DIASTOLIC BLOOD PRESSURE: 72 MMHG

## 2024-09-04 DIAGNOSIS — E03.9 ACQUIRED HYPOTHYROIDISM: Primary | ICD-10-CM

## 2024-09-04 DIAGNOSIS — E55.9 VITAMIN D DEFICIENCY: ICD-10-CM

## 2024-09-04 LAB
25(OH)D3 SERPL-MCNC: 25.8 NG/ML (ref 30–100)
ALBUMIN SERPL-MCNC: 4.2 G/DL (ref 3.5–5.2)
ALBUMIN/GLOB SERPL: 1.4 G/DL
ALP SERPL-CCNC: 102 U/L (ref 39–117)
ALT SERPL W P-5'-P-CCNC: 11 U/L (ref 1–33)
ANION GAP SERPL CALCULATED.3IONS-SCNC: 9 MMOL/L (ref 5–15)
AST SERPL-CCNC: 15 U/L (ref 1–32)
BILIRUB SERPL-MCNC: 0.9 MG/DL (ref 0–1.2)
BUN SERPL-MCNC: 13 MG/DL (ref 8–23)
BUN/CREAT SERPL: 19.7 (ref 7–25)
CALCIUM SPEC-SCNC: 9.5 MG/DL (ref 8.6–10.5)
CHLORIDE SERPL-SCNC: 102 MMOL/L (ref 98–107)
CHOLEST SERPL-MCNC: 197 MG/DL (ref 0–200)
CO2 SERPL-SCNC: 26 MMOL/L (ref 22–29)
CREAT SERPL-MCNC: 0.66 MG/DL (ref 0.57–1)
EGFRCR SERPLBLD CKD-EPI 2021: 98.7 ML/MIN/1.73
GLOBULIN UR ELPH-MCNC: 2.9 GM/DL
GLUCOSE SERPL-MCNC: 92 MG/DL (ref 65–99)
HBA1C MFR BLD: 5.5 % (ref 4.8–5.6)
HDLC SERPL-MCNC: 53 MG/DL (ref 40–60)
LDLC SERPL CALC-MCNC: 114 MG/DL (ref 0–100)
LDLC/HDLC SERPL: 2.06 {RATIO}
POTASSIUM SERPL-SCNC: 4.2 MMOL/L (ref 3.5–5.2)
PROT SERPL-MCNC: 7.1 G/DL (ref 6–8.5)
SODIUM SERPL-SCNC: 137 MMOL/L (ref 136–145)
T4 FREE SERPL-MCNC: 1.25 NG/DL (ref 0.92–1.68)
TRIGL SERPL-MCNC: 173 MG/DL (ref 0–150)
TSH SERPL DL<=0.05 MIU/L-ACNC: 1.42 UIU/ML (ref 0.27–4.2)
VLDLC SERPL-MCNC: 30 MG/DL (ref 5–40)

## 2024-09-04 PROCEDURE — 83036 HEMOGLOBIN GLYCOSYLATED A1C: CPT | Performed by: INTERNAL MEDICINE

## 2024-09-04 PROCEDURE — 84443 ASSAY THYROID STIM HORMONE: CPT | Performed by: INTERNAL MEDICINE

## 2024-09-04 PROCEDURE — 82306 VITAMIN D 25 HYDROXY: CPT | Performed by: INTERNAL MEDICINE

## 2024-09-04 PROCEDURE — 36415 COLL VENOUS BLD VENIPUNCTURE: CPT | Performed by: INTERNAL MEDICINE

## 2024-09-04 PROCEDURE — 80053 COMPREHEN METABOLIC PANEL: CPT | Performed by: INTERNAL MEDICINE

## 2024-09-04 PROCEDURE — 84439 ASSAY OF FREE THYROXINE: CPT | Performed by: INTERNAL MEDICINE

## 2024-09-04 PROCEDURE — 80061 LIPID PANEL: CPT | Performed by: INTERNAL MEDICINE

## 2024-09-04 PROCEDURE — 99214 OFFICE O/P EST MOD 30 MIN: CPT | Performed by: INTERNAL MEDICINE

## 2024-09-04 NOTE — PROGRESS NOTES
"Hypothyroidism    Subjective    China Gomes is a 64 y.o. female. she is here today for follow-up    Hypothyroidism, diagnosed in 2012.   On levothyroxine and the dose increased to 88 mcg.      Vit D deficiency - level was 5.8. In 2/2018 - she started high dose weekly supplement.  The levels were low.     Patient reported mild fatigue. Overall improved from last visit. Spider bite in spring - improved.       Review of Systems  Review of Systems   Constitutional:  Positive for fatigue.   Skin:         Hair loss     Current medications:  Current Outpatient Medications   Medication Sig Dispense Refill    estradiol (ESTRACE) 1 MG tablet Take 0.5 tablets by mouth Daily.      fluconazole (Diflucan) 150 MG tablet Take 1 tablet by mouth As Needed (vagin yeast infection). 5 tablet 0    levothyroxine (SYNTHROID, LEVOTHROID) 88 MCG tablet TAKE 1 TABLET BY MOUTH DAILY 90 tablet 2    mupirocin (BACTROBAN) 2 % ointment Apply 1 Application topically to the appropriate area as directed 3 (Three) Times a Day. 30 g 1    Omeprazole Magnesium (PRILOSEC PO)       Progesterone (PROMETRIUM) 200 MG capsule Take 1 capsule by mouth Daily.      Tretinoin 0.05 % lotion       vitamin D (ERGOCALCIFEROL) 1.25 MG (05785 UT) capsule capsule TAKE 1 CAPSULE BY MOUTH 1 TIME EVERY WEEK 13 capsule 3     No current facility-administered medications for this visit.         Objective      Vitals:    09/04/24 0849   BP: 122/72   Pulse: 71   Weight: 83.9 kg (185 lb)   Height: 180.3 cm (70.98\")   Body mass index is 25.81 kg/m².  Physical Exam   Constitutional: She is oriented to person, place, and time. She appears well-developed and well-nourished.   HENT:   Head: Normocephalic and atraumatic.   Cardiovascular: Normal rate, regular rhythm, normal heart sounds and normal pulses.   Pulmonary/Chest: Effort normal and breath sounds normal.   Musculoskeletal: No swelling.   Neurological: She is alert and oriented to person, place, and time.   Skin: Skin is " warm and dry.   Psychiatric: Her behavior is normal. Judgment and thought content normal.       LABS AND IMAGING  No visits with results within 1 Month(s) from this visit.   Latest known visit with results is:   Office Visit on 10/18/2023   Component Date Value Ref Range Status    TSH 10/18/2023 1.530  0.270 - 4.200 uIU/mL Final    Free T4 10/18/2023 1.44  0.93 - 1.70 ng/dL Final    Results may be falsely increased if patient taking Biotin.    25 Hydroxy, Vitamin D 10/18/2023 21.2 (L)  30.0 - 100.0 ng/ml Final    Comment: Reference Range for Total Vitamin D 25(OH)  Deficiency <20.0 ng/mL  Insufficiency 21-29 ng/mL  Sufficiency  ng/mL  Toxicity >100 ng/ml         1. Acquired hypothyroidism    2. Vitamin D deficiency        Assessment & Plan      Problem List Items Addressed This Visit          Endocrine and Metabolic    Hypothyroidism - Primary    Relevant Orders    Comprehensive Metabolic Panel    Lipid Panel    TSH    T4, Free    Hemoglobin A1c    Vitamin D deficiency    Relevant Orders    Vitamin D,25-Hydroxy         PLAN  Continue same dose levothyroxine 88 µg. Repeat labs for dose adjustments. She experiences some symptoms of hypothyroidism and I will consider trial of brand name medication.      Cont  vitamin D 50 000 IU weekly. Repeat labs today     Fasting labs ordered today.      Follow-up in 12 months.

## 2024-10-10 ENCOUNTER — TELEPHONE (OUTPATIENT)
Dept: INTERNAL MEDICINE | Facility: CLINIC | Age: 64
End: 2024-10-10
Payer: COMMERCIAL

## 2024-10-18 ENCOUNTER — OFFICE VISIT (OUTPATIENT)
Dept: INTERNAL MEDICINE | Facility: CLINIC | Age: 64
End: 2024-10-18
Payer: COMMERCIAL

## 2024-10-18 VITALS
WEIGHT: 190 LBS | SYSTOLIC BLOOD PRESSURE: 130 MMHG | HEIGHT: 70 IN | BODY MASS INDEX: 27.2 KG/M2 | DIASTOLIC BLOOD PRESSURE: 86 MMHG | HEART RATE: 79 BPM | OXYGEN SATURATION: 98 % | TEMPERATURE: 98 F

## 2024-10-18 DIAGNOSIS — B00.1 FEVER BLISTER: Primary | ICD-10-CM

## 2024-10-18 PROCEDURE — 99213 OFFICE O/P EST LOW 20 MIN: CPT | Performed by: NURSE PRACTITIONER

## 2024-10-18 RX ORDER — VALACYCLOVIR HYDROCHLORIDE 1 G/1
1000 TABLET, FILM COATED ORAL 3 TIMES DAILY
Qty: 21 TABLET | Refills: 1 | Status: SHIPPED | OUTPATIENT
Start: 2024-10-18 | End: 2024-10-25

## 2024-10-18 RX ORDER — VALACYCLOVIR HYDROCHLORIDE 1 G/1
1000 TABLET, FILM COATED ORAL 3 TIMES DAILY
Qty: 21 TABLET | Refills: 0 | Status: SHIPPED | OUTPATIENT
Start: 2024-10-18 | End: 2024-10-18 | Stop reason: SDUPTHER

## 2024-10-18 NOTE — PROGRESS NOTES
Office Note     Name: China Gomes    : 1960     MRN: [id number]     Chief Complaint  Mouth Lesions (Patient has cold sores on her mouth and it goes fro her upper lip to her nose.little discharge coming out of it, but mostly dry. Been there for about 2 days. )    Subjective     History of Present Illness:  China Gomes is a 64 y.o. female who presents today for skin concerns    Patient is here today for fever blister on her upper lip.  This has been present about 2 days.  She states she has not had this before for many many years.  She denies any other needed concerns        Past Medical History:   Diagnosis Date    GERD (gastroesophageal reflux disease)     Hashimoto's disease     Hypothyroidism        Past Surgical History:   Procedure Laterality Date    CHOLECYSTECTOMY      CYST REMOVAL Left     HX OVARIAN CYSTECTOMY Bilateral     OTHER SURGICAL HISTORY      ear pressure equalization tube, insertion, bilaterally    TYMPANOPLASTY         Social History     Socioeconomic History    Marital status:    Tobacco Use    Smoking status: Never    Smokeless tobacco: Never   Vaping Use    Vaping status: Never Used   Substance and Sexual Activity    Alcohol use: Yes     Comment: seldom, 1-2 drinks per year    Drug use: No    Sexual activity: Yes     Partners: Male         Current Outpatient Medications:     estradiol (ESTRACE) 1 MG tablet, Take 0.5 tablets by mouth Daily., Disp: , Rfl:     levothyroxine (SYNTHROID, LEVOTHROID) 88 MCG tablet, TAKE 1 TABLET BY MOUTH DAILY, Disp: 90 tablet, Rfl: 2    mupirocin (BACTROBAN) 2 % ointment, Apply 1 Application topically to the appropriate area as directed 3 (Three) Times a Day., Disp: 30 g, Rfl: 1    Omeprazole Magnesium (PRILOSEC PO), , Disp: , Rfl:     Progesterone (PROMETRIUM) 200 MG capsule, Take 1 capsule by mouth Daily., Disp: , Rfl:     Tretinoin 0.05 % lotion, , Disp: , Rfl:     valACYclovir (Valtrex) 1000 MG tablet, Take 1 tablet by mouth 3  "(Three) Times a Day for 7 days., Disp: 21 tablet, Rfl: 1    vitamin D (ERGOCALCIFEROL) 1.25 MG (34921 UT) capsule capsule, TAKE 1 CAPSULE BY MOUTH 1 TIME EVERY WEEK, Disp: 13 capsule, Rfl: 3    Objective     Vital Signs  /86 (BP Location: Left arm, Patient Position: Sitting, Cuff Size: Adult)   Pulse 79   Temp 98 °F (36.7 °C)   Ht 177.8 cm (70\")   Wt 86.2 kg (190 lb)   SpO2 98%   BMI 27.26 kg/m²   Estimated body mass index is 27.26 kg/m² as calculated from the following:    Height as of this encounter: 177.8 cm (70\").    Weight as of this encounter: 86.2 kg (190 lb).           Physical Exam  Vitals and nursing note reviewed.   Constitutional:       Appearance: Normal appearance.   HENT:      Head: Normocephalic and atraumatic.   Eyes:      Extraocular Movements: Extraocular movements intact.      Pupils: Pupils are equal, round, and reactive to light.   Cardiovascular:      Rate and Rhythm: Normal rate and regular rhythm.   Pulmonary:      Effort: Pulmonary effort is normal.   Musculoskeletal:         General: Normal range of motion.   Skin:     General: Skin is warm and dry.      Comments: Vesicles noted above the upper lip of the mouth.  Scattered, erythematous.  No current open areas   Neurological:      Mental Status: She is alert and oriented to person, place, and time.   Psychiatric:         Mood and Affect: Mood normal.         Behavior: Behavior normal.               Assessment and Plan     Diagnoses and all orders for this visit:    1. Fever blister (Primary)  -     Discontinue: valACYclovir (Valtrex) 1000 MG tablet; Take 1 tablet by mouth 3 (Three) Times a Day for 7 days.  Dispense: 21 tablet; Refill: 0  -     valACYclovir (Valtrex) 1000 MG tablet; Take 1 tablet by mouth 3 (Three) Times a Day for 7 days.  Dispense: 21 tablet; Refill: 1    Plan  Discussed with patient that we will move forward with antiviral therapy.  I did send an additional prescription for refill as patient does know when she " has fever blisters/cold sores.  Continue to monitor the area for any changes  Go to ER if any condition worsens or severe  Patient is scheduled for the end of the month but she may reschedule till after the beginning of the year.  Discussed with patient that if she does need refills or anything additional prior to that time she is welcome to call the office and let me know    Follow Up  Return for Patient will likely change her next appointment.    JOSE LUIS Shah    Part of this note may be an electronic transcription/translation of spoken language to printed text using the Dragon Dictation System.

## 2024-11-07 DIAGNOSIS — E03.9 ACQUIRED HYPOTHYROIDISM: ICD-10-CM

## 2024-11-07 NOTE — TELEPHONE ENCOUNTER
Rx Refill Note  Requested Prescriptions     Pending Prescriptions Disp Refills    levothyroxine (SYNTHROID, LEVOTHROID) 88 MCG tablet [Pharmacy Med Name: LEVOTHYROXINE 0.088MG (88MCG) TAB] 90 tablet 2     Sig: TAKE 1 TABLET BY MOUTH DAILY      Last office visit with prescribing clinician: 9/4/2024   Last telemedicine visit with prescribing clinician: Visit date not found   Next office visit with prescribing clinician: 9/4/2025                         Would you like a call back once the refill request has been completed: [] Yes [] No    If the office needs to give you a call back, can they leave a voicemail: [] Yes [] No    Shelia Mehta MA  11/07/24, 14:33 EST

## 2024-11-08 RX ORDER — LEVOTHYROXINE SODIUM 88 UG/1
88 TABLET ORAL DAILY
Qty: 90 TABLET | Refills: 2 | Status: SHIPPED | OUTPATIENT
Start: 2024-11-08

## 2024-12-23 ENCOUNTER — TELEPHONE (OUTPATIENT)
Dept: INTERNAL MEDICINE | Facility: CLINIC | Age: 64
End: 2024-12-23

## 2024-12-23 ENCOUNTER — OFFICE VISIT (OUTPATIENT)
Dept: INTERNAL MEDICINE | Facility: CLINIC | Age: 64
End: 2024-12-23
Payer: COMMERCIAL

## 2024-12-23 VITALS
WEIGHT: 188.2 LBS | HEIGHT: 70 IN | SYSTOLIC BLOOD PRESSURE: 118 MMHG | OXYGEN SATURATION: 98 % | DIASTOLIC BLOOD PRESSURE: 78 MMHG | HEART RATE: 78 BPM | BODY MASS INDEX: 26.94 KG/M2

## 2024-12-23 DIAGNOSIS — Z00.00 ANNUAL PHYSICAL EXAM: Primary | ICD-10-CM

## 2024-12-23 DIAGNOSIS — Z78.9 NON-SMOKER: ICD-10-CM

## 2024-12-23 DIAGNOSIS — Z76.89 ESTABLISHING CARE WITH NEW DOCTOR, ENCOUNTER FOR: ICD-10-CM

## 2024-12-23 DIAGNOSIS — Z13.31 DEPRESSION SCREEN: ICD-10-CM

## 2024-12-23 DIAGNOSIS — Z28.21 IMMUNIZATION DECLINED: ICD-10-CM

## 2024-12-23 DIAGNOSIS — E55.9 VITAMIN D DEFICIENCY: ICD-10-CM

## 2024-12-23 DIAGNOSIS — Z00.00 ENCOUNTER FOR WELL ADULT EXAM WITHOUT ABNORMAL FINDINGS: ICD-10-CM

## 2024-12-23 DIAGNOSIS — E03.9 ACQUIRED HYPOTHYROIDISM: ICD-10-CM

## 2024-12-23 PROCEDURE — 99396 PREV VISIT EST AGE 40-64: CPT | Performed by: NURSE PRACTITIONER

## 2024-12-23 PROCEDURE — 1160F RVW MEDS BY RX/DR IN RCRD: CPT | Performed by: NURSE PRACTITIONER

## 2024-12-23 PROCEDURE — 3074F SYST BP LT 130 MM HG: CPT | Performed by: NURSE PRACTITIONER

## 2024-12-23 PROCEDURE — 3078F DIAST BP <80 MM HG: CPT | Performed by: NURSE PRACTITIONER

## 2024-12-23 PROCEDURE — 1159F MED LIST DOCD IN RCRD: CPT | Performed by: NURSE PRACTITIONER

## 2024-12-23 PROCEDURE — 1126F AMNT PAIN NOTED NONE PRSNT: CPT | Performed by: NURSE PRACTITIONER

## 2024-12-23 NOTE — PROGRESS NOTES
Annual Physical     Name: China Gomes    : 1960     MRN: [id number]     Chief Complaint  Establish Care    Subjective     History of Present Illness:  China Gomes is a 64 y.o. female who presents today for annual physical exam    Patient currently follows with endocrinology regarding her hypothyroidism.  Patient states there is also family history of thyroid disease.  Daughter does have a pituitary tumor which does affect her thyroid.  Patient also has vitamin D deficiency with use of additional vitamin D supplementation    Depression screen completed today with negative results    Last mammogram was completed through [facility].    Patient follows with Dr. Perera for women's health    Patient has not yet had a DEXA scan    Patient's last colonoscopy was  through [facility].  Patient would like to see Dr. Deja Enamorado again    It is on her to do list updated her dental and vision screenings    She respectfully declined immunizations today    Patient does follow a healthy lifestyle    She is a non-smoker.  No excessive alcohol intake or drug use    Last labs were completed through endocrinology in September.  Patient states this provider usually does her labs every 6 months    Patient states she and her  are still very active working.  Her  is a     The patient is being seen for a health maintenance evaluation.    Past Medical History:   Diagnosis Date    GERD (gastroesophageal reflux disease)     Hashimoto's disease     Hypothyroidism        Past Surgical History:   Procedure Laterality Date    CHOLECYSTECTOMY      CYST REMOVAL Left     HX OVARIAN CYSTECTOMY Bilateral     OTHER SURGICAL HISTORY      ear pressure equalization tube, insertion, bilaterally    TYMPANOPLASTY         Social History     Socioeconomic History    Marital status:    Tobacco Use    Smoking status: Never    Smokeless tobacco: Never   Vaping Use    Vaping status: Never Used  "  Substance and Sexual Activity    Alcohol use: Yes     Comment: seldom, 1-2 drinks per year    Drug use: No    Sexual activity: Yes     Partners: Male         Current Outpatient Medications:     estradiol (ESTRACE) 1 MG tablet, Take 0.5 tablets by mouth Daily., Disp: , Rfl:     levothyroxine (SYNTHROID, LEVOTHROID) 88 MCG tablet, TAKE 1 TABLET BY MOUTH DAILY, Disp: 90 tablet, Rfl: 2    Omeprazole Magnesium (PRILOSEC PO), , Disp: , Rfl:     Progesterone (PROMETRIUM) 200 MG capsule, Take 1 capsule by mouth Daily., Disp: , Rfl:     vitamin D (ERGOCALCIFEROL) 1.25 MG (19944 UT) capsule capsule, TAKE 1 CAPSULE BY MOUTH 1 TIME EVERY WEEK, Disp: 13 capsule, Rfl: 3    General History  China  does not have regular dental visits.  She does complain of vision problems. Last eye exam was - not up to date.  Immunizations are not up to date. The patient needs the following immunizations: Respectfully declined    Lifestyle  China  consumes in general, a \"healthy\" diet  .  She exercises intermittently.    Reproductive Health  China  is postmenopausal.  She reports periods are rare.      Screening  Last pap was with Dr. Perera 2023.  Will request records  Last Completed Pap Smear            PAP SMEAR (Every 3 Years) Next due on 7/19/2026 07/19/2023  Done    11/23/2018  Done                . History of abnormal pap smear or family history of gyn cancer: None stated  Last mammogram was 2019 through [facility]  Last Completed Mammogram       This patient has no relevant Health Maintenance data.        . Personal or family history of abnormal mammograms or breast cancer: None stated  Last colonoscopy was 2016 with  [place] Dr. Deja Enamorado  Last Completed Colonoscopy            COLORECTAL CANCER SCREENING (COLONOSCOPY - Every 10 Years) Next due on 10/27/2026      10/27/2016  COLONOSCOPY (Done)                . Family history of colon cancer: None stated  Last DEXA was never.    Advance Care Planning   ACP discussion was " held with the patient during this visit. Patient has an advance directive (not in EMR), copy requested.       Health Maintenance Summary            Overdue - MAMMOGRAM (Every 2 Years) Overdue since 2/18/2021 02/18/2019  SCANNED - MAMMO              Postponed - ZOSTER VACCINE (1 of 2) Postponed until 12/23/2024 12/23/2024  Postponed until 12/23/2024 by Selene Marin APRN (Patient Refused)    09/15/2022  Postponed until 9/15/2023 by Maribel Amin MA (Pending event)              BMI FOLLOWUP (Yearly) Next due on 4/18/2025 04/18/2024  Smart[employer]: WORKFLOW - QUALITY MEASUREMENT - DOCUMENTED WEIGHT FOLLOW-UP PLAN    04/08/2024  Smart[employer]: WORKFLOW - QUALITY MEASUREMENT - DOCUMENTED WEIGHT FOLLOW-UP PLAN              ANNUAL PHYSICAL (Yearly) Next due on 12/23/2025 12/23/2024  Done    08/10/2023  Done              TDAP/TD VACCINES (2 - Td or Tdap) Next due on 6/8/2026 01/19/2023  Postponed until 1/19/2023 by Maribel Amin MA (Pending event)    12/22/2022  Postponed until 12/22/2022 by Maribel Amin MA (Pending event)    09/15/2022  Postponed until 9/15/2022 by Maribel Amin MA (Pending event)    06/08/2016  Imm Admin: Tdap              PAP SMEAR (Every 3 Years) Next due on 7/19/2026 07/19/2023  Done    11/23/2018  Done              COLORECTAL CANCER SCREENING (COLONOSCOPY - Every 10 Years) Next due on 10/27/2026      10/27/2016  COLONOSCOPY (Done)              HEPATITIS C SCREENING  Completed      08/10/2023  Hep C Virus Ab component of Hepatitis C Antibody              Pneumococcal Vaccine 0-64 (Series Information) Aged Out      No completion, postpone, or frequency change history exists for this topic.              Discontinued - COVID-19 Vaccine  Discontinued      08/13/2024  Frequency changed to Never by Selene Marin APRN (Patient Preference)    08/12/2024  Postponed until 11/1/2024 by Veronica Jonas MA (Product Unavailable)    08/10/2023  Postponed until  "8/29/2025 by Maribel Amin MA (Patient Refused)    01/19/2023  Postponed until 2/7/2023 by Maribel Amin MA (Pending event)    09/15/2022  Postponed until 10/4/2022 by Maribel Amin MA (Pending event)    Only the first 5 history entries have been loaded, but more history exists.              Discontinued - INFLUENZA VACCINE  Discontinued      08/13/2024  Frequency changed to Never by Selene Marin APRN (Patient Preference)    08/12/2024  Postponed until 3/31/2025 by Veronica Jonas MA (Product Unavailable)    01/19/2023  Postponed until 3/31/2023 by Maribel Amin MA (Patient Refused)    01/03/2019  Declined                  Immunization History   Administered Date(s) Administered    Hepatitis A 12/04/2018, 06/06/2019    Tdap 06/08/2016       Review of Systems    Objective     Vital Signs  /78 (BP Location: Left arm, Patient Position: Sitting, Cuff Size: Adult)   Pulse 78   Ht 177.8 cm (70\")   Wt 85.4 kg (188 lb 3.2 oz)   SpO2 98%   BMI 27.00 kg/m²   Estimated body mass index is 27 kg/m² as calculated from the following:    Height as of this encounter: 177.8 cm (70\").    Weight as of this encounter: 85.4 kg (188 lb 3.2 oz).            PHQ-9 Depression Screening  Little interest or pleasure in doing things? Not at all   Feeling down, depressed, or hopeless? Not at all   PHQ-2 Total Score 0   Trouble falling or staying asleep, or sleeping too much?     Feeling tired or having little energy?     Poor appetite or overeating?     Feeling bad about yourself - or that you are a failure or have let yourself or your family down?     Trouble concentrating on things, such as reading the newspaper or watching television?     Moving or speaking so slowly that other people could have noticed? Or the opposite - being so fidgety or restless that you have been moving around a lot more than usual?     Thoughts that you would be better off dead, or of hurting yourself in some way?     PHQ-9 Total " Score     If you checked off any problems, how difficult have these problems made it for you to do your work, take care of things at home, or get along with other people? Not difficult at all     PHQ-9 Total Score:             Physical Exam  Vitals and nursing note reviewed.   Constitutional:       General: She is awake.      Appearance: Normal appearance. She is well-groomed and overweight.   HENT:      Head: Normocephalic and atraumatic.   Eyes:      Extraocular Movements: Extraocular movements intact.      Pupils: Pupils are equal, round, and reactive to light.   Neck:      Thyroid: No thyroid mass, thyromegaly or thyroid tenderness.   Cardiovascular:      Rate and Rhythm: Normal rate and regular rhythm.      Pulses: Normal pulses.           Radial pulses are 2+ on the right side and 2+ on the left side.        Posterior tibial pulses are 2+ on the right side and 2+ on the left side.      Heart sounds: Normal heart sounds, S1 normal and S2 normal.   Pulmonary:      Effort: Pulmonary effort is normal.      Breath sounds: Normal breath sounds.   Abdominal:      General: Bowel sounds are normal.      Palpations: Abdomen is soft.   Musculoskeletal:         General: Normal range of motion.      Right lower leg: No edema.      Left lower leg: No edema.   Skin:     General: Skin is warm and dry.   Neurological:      Mental Status: She is alert and oriented to person, place, and time.      Comments: Mental status fully intact as patient was able to provide a detailed description of the events   Psychiatric:         Mood and Affect: Mood normal.         Behavior: Behavior normal. Behavior is cooperative.         Thought Content: Thought content normal.         Judgment: Judgment normal.                   Assessment and Plan     Diagnoses and all orders for this visit:    1. Annual physical exam (Primary)    2. Encounter for well adult exam without abnormal findings    3. Establishing care with new doctor, encounter  for    4. Acquired hypothyroidism    5. Vitamin D deficiency    6. Immunization declined    7. Depression screen    8. Non-smoker    9. BMI 27.0-27.9,adult    Plan  Annual physical exam completed with patient today as well as establish care    Patient will continue to follow with OB/GYN with use of estradiol and progesterone.  Will request records from Dr. Perera    Will request records for last mammogram to [facility]    Patient will continue with endocrinology with use of levothyroxine and vitamin D    Labs were obtained in September so we will hold on labs today    Immunizations declined    Depression screen with negative results    Continue with non-smoking status and healthy lifestyle    We will plan to follow-up for next physical January 2026    Go to ER if any condition worsens or severe    Follow Up  Return for physical- jan 2026.    JOSE LUIS Shah    Part of this note may be an electronic transcription/translation of spoken language to printed text using the Dragon Dictation System.

## 2024-12-23 NOTE — TELEPHONE ENCOUNTER
Records requested to [facility] at [phone] and Dr Perera at [phone]

## 2024-12-23 NOTE — TELEPHONE ENCOUNTER
Please request records for mammogram results from [facility]    Patient also follows with Dr. Perera with OB/GYN.  Please also request most recent records

## 2025-01-14 DIAGNOSIS — E03.9 ACQUIRED HYPOTHYROIDISM: ICD-10-CM

## 2025-01-14 RX ORDER — LEVOTHYROXINE SODIUM 88 UG/1
88 TABLET ORAL DAILY
Qty: 30 TABLET | Refills: 5 | Status: SHIPPED | OUTPATIENT
Start: 2025-01-14

## 2025-01-14 RX ORDER — LEVOTHYROXINE SODIUM 88 UG/1
88 TABLET ORAL DAILY
Qty: 90 TABLET | Refills: 0 | OUTPATIENT
Start: 2025-01-14

## 2025-01-14 NOTE — TELEPHONE ENCOUNTER
Called the phar and spoke to Tiago due to the pt stating she is only to getting a 30 day supply. Tiago said the ins will only fill 30 day supply.     Script is pending review and pt is aware.    Rx Refill Note    Requested Prescriptions      No prescriptions requested or ordered in this encounter        Last office visit with prescribing clinician: 9/4/2024       Next office visit with prescribing clinician: 9/4/2025     {    Nuvia Perez MA  01/14/25, 14:21 EST

## 2025-04-03 NOTE — PROGRESS NOTES
Addended by: IRIS WING on: 4/3/2025 09:55 AM     Modules accepted: Orders     Physical Therapy Daily Progress Note    [address] [facility], [address]  [place] [address]      Patient: China Gomes   : 1960  Diagnosis/ICD-10 Code:  Acute pain of right shoulder [M25.511]  Referring practitioner: No ref. provider found  Date of Initial Visit: Type: THERAPY  Noted: 10/21/2022  Today's Date: 10/24/2022  Patient seen for 2 sessions           Patient reports: sleeping much better since initial evaluation but her shoulder has felt tender the past few days as she has stretched it.       Objective   See Exercise, Manual, and Modality Logs for complete treatment.       Assessment/Plan  R costotransverse joints remain hypersensitive, though she tolerated low-grade mobilization without increased pain afterward. Added scapular strengthening and thoracic mobility, during which she reported pain but no increase afterward. Many exercises in HEP required correction.            Timed:  Manual Therapy:    15     mins  08574;  Therapeutic Exercise:    27     mins  74680;     Neuromuscular Eugenia:   10    mins  20034;    Therapeutic Activity:     0     mins  81324;     Gait Trainin     mins  71353;     Ultrasound:     0     mins  60681;    Electrical Stimulation:    0     mins  89028 ( );    Untimed:  Electrical Stimulation:    0     mins  68839 ( );  Mechanical Traction:    0     mins  80996;     Timed Treatment:   52   mins   Total Treatment:     52   mins    Jostin Cruz PT  Physical Therapist

## 2025-06-19 RX ORDER — ERGOCALCIFEROL 1.25 MG/1
50000 CAPSULE, LIQUID FILLED ORAL WEEKLY
Qty: 13 CAPSULE | Refills: 0 | Status: SHIPPED | OUTPATIENT
Start: 2025-06-19

## 2025-06-19 NOTE — TELEPHONE ENCOUNTER
Rx Refill Note  Requested Prescriptions     Pending Prescriptions Disp Refills    vitamin D (ERGOCALCIFEROL) 1.25 MG (69342 UT) capsule capsule [Pharmacy Med Name: VITAMIN D2 50,000IU (ERGO) CAP RX] 13 capsule 0     Sig: TAKE 1 CAPSULE BY MOUTH 1 TIME EVERY WEEK      Last office visit with prescribing clinician: 9/4/2024     Next office visit with prescribing clinician: 9/4/2025     Johana Mancilla MA  06/19/25, 14:53 EDT

## 2025-07-01 DIAGNOSIS — E03.9 ACQUIRED HYPOTHYROIDISM: ICD-10-CM

## 2025-07-01 RX ORDER — LEVOTHYROXINE SODIUM 88 UG/1
88 TABLET ORAL DAILY
Qty: 90 TABLET | Refills: 0 | Status: SHIPPED | OUTPATIENT
Start: 2025-07-01